# Patient Record
Sex: MALE | Race: WHITE | Employment: OTHER | URBAN - METROPOLITAN AREA
[De-identification: names, ages, dates, MRNs, and addresses within clinical notes are randomized per-mention and may not be internally consistent; named-entity substitution may affect disease eponyms.]

---

## 2019-12-12 ENCOUNTER — APPOINTMENT (OUTPATIENT)
Dept: GENERAL RADIOLOGY | Age: 84
End: 2019-12-12
Payer: MEDICARE

## 2019-12-12 ENCOUNTER — HOSPITAL ENCOUNTER (EMERGENCY)
Age: 84
Discharge: HOME | End: 2019-12-13
Attending: EMERGENCY MEDICINE
Payer: MEDICARE

## 2019-12-12 VITALS
BODY MASS INDEX: 26.08 KG/M2 | OXYGEN SATURATION: 98 % | WEIGHT: 186.29 LBS | HEIGHT: 71 IN | SYSTOLIC BLOOD PRESSURE: 156 MMHG | DIASTOLIC BLOOD PRESSURE: 61 MMHG | HEART RATE: 56 BPM | RESPIRATION RATE: 17 BRPM | TEMPERATURE: 97.1 F

## 2019-12-12 DIAGNOSIS — S81.812A LACERATION OF LEFT LOWER EXTREMITY, INITIAL ENCOUNTER: Primary | ICD-10-CM

## 2019-12-12 PROCEDURE — 90715 TDAP VACCINE 7 YRS/> IM: CPT | Performed by: EMERGENCY MEDICINE

## 2019-12-12 PROCEDURE — 99283 EMERGENCY DEPT VISIT LOW MDM: CPT

## 2019-12-12 PROCEDURE — 73630 X-RAY EXAM OF FOOT: CPT

## 2019-12-12 PROCEDURE — 4500000023 HC ED LEVEL 3 PROCEDURE

## 2019-12-12 PROCEDURE — 90471 IMMUNIZATION ADMIN: CPT | Performed by: EMERGENCY MEDICINE

## 2019-12-12 PROCEDURE — 6360000002 HC RX W HCPCS: Performed by: EMERGENCY MEDICINE

## 2019-12-12 PROCEDURE — 73590 X-RAY EXAM OF LOWER LEG: CPT

## 2019-12-12 RX ORDER — CEPHALEXIN 500 MG/1
1000 CAPSULE ORAL ONCE
Status: COMPLETED | OUTPATIENT
Start: 2019-12-13 | End: 2019-12-13

## 2019-12-12 RX ORDER — CEPHALEXIN 500 MG/1
500 CAPSULE ORAL 3 TIMES DAILY
Qty: 30 CAPSULE | Refills: 0 | Status: SHIPPED | OUTPATIENT
Start: 2019-12-12 | End: 2019-12-22

## 2019-12-12 RX ADMIN — TETANUS TOXOID, REDUCED DIPHTHERIA TOXOID AND ACELLULAR PERTUSSIS VACCINE, ADSORBED 0.5 ML: 5; 2.5; 8; 8; 2.5 SUSPENSION INTRAMUSCULAR at 23:09

## 2019-12-12 SDOH — HEALTH STABILITY: MENTAL HEALTH: HOW OFTEN DO YOU HAVE A DRINK CONTAINING ALCOHOL?: NEVER

## 2019-12-12 ASSESSMENT — PAIN SCALES - GENERAL
PAINLEVEL_OUTOF10: 0
PAINLEVEL_OUTOF10: 0

## 2019-12-13 PROCEDURE — 6370000000 HC RX 637 (ALT 250 FOR IP): Performed by: EMERGENCY MEDICINE

## 2019-12-13 RX ADMIN — CEPHALEXIN 1000 MG: 500 CAPSULE ORAL at 00:00

## 2019-12-13 ASSESSMENT — ENCOUNTER SYMPTOMS
NAUSEA: 0
BACK PAIN: 1
SORE THROAT: 0
DIARRHEA: 0
EYE DISCHARGE: 0
VOMITING: 0
COUGH: 0
EYE REDNESS: 0
WHEEZING: 0
ABDOMINAL PAIN: 0
EYE PAIN: 0
RHINORRHEA: 0
SHORTNESS OF BREATH: 0

## 2021-08-01 ENCOUNTER — APPOINTMENT (OUTPATIENT)
Dept: GENERAL RADIOLOGY | Age: 86
DRG: 560 | End: 2021-08-01
Payer: MEDICARE

## 2021-08-01 ENCOUNTER — ANESTHESIA (OUTPATIENT)
Dept: OPERATING ROOM | Age: 86
DRG: 560 | End: 2021-08-01
Payer: MEDICARE

## 2021-08-01 ENCOUNTER — HOSPITAL ENCOUNTER (INPATIENT)
Age: 86
LOS: 5 days | Discharge: NURSING, REHAB OR LONG-TERM CARE | DRG: 560 | End: 2021-08-06
Attending: EMERGENCY MEDICINE | Admitting: INTERNAL MEDICINE
Payer: MEDICARE

## 2021-08-01 ENCOUNTER — ANESTHESIA EVENT (OUTPATIENT)
Dept: OPERATING ROOM | Age: 86
DRG: 560 | End: 2021-08-01
Payer: MEDICARE

## 2021-08-01 VITALS
DIASTOLIC BLOOD PRESSURE: 55 MMHG | SYSTOLIC BLOOD PRESSURE: 104 MMHG | RESPIRATION RATE: 1 BRPM | OXYGEN SATURATION: 100 %

## 2021-08-01 DIAGNOSIS — Z96.649 DISLOCATION OF HIP JOINT PROSTHESIS, INITIAL ENCOUNTER (HCC): Primary | ICD-10-CM

## 2021-08-01 DIAGNOSIS — E87.1 HYPONATREMIA: ICD-10-CM

## 2021-08-01 DIAGNOSIS — T84.029A DISLOCATION OF HIP JOINT PROSTHESIS, INITIAL ENCOUNTER (HCC): Primary | ICD-10-CM

## 2021-08-01 PROBLEM — S73.004A CLOSED DISLOCATION OF RIGHT HIP (HCC): Status: ACTIVE | Noted: 2021-08-01

## 2021-08-01 LAB
ANION GAP SERPL CALCULATED.3IONS-SCNC: 13 MMOL/L (ref 3–16)
BASOPHILS ABSOLUTE: 0.2 K/UL (ref 0–0.2)
BASOPHILS RELATIVE PERCENT: 1 %
BUN BLDV-MCNC: 29 MG/DL (ref 7–20)
CALCIUM SERPL-MCNC: 7.8 MG/DL (ref 8.3–10.6)
CHLORIDE BLD-SCNC: 93 MMOL/L (ref 99–110)
CO2: 20 MMOL/L (ref 21–32)
CREAT SERPL-MCNC: 0.9 MG/DL (ref 0.8–1.3)
EKG ATRIAL RATE: 59 BPM
EKG DIAGNOSIS: NORMAL
EKG P AXIS: 96 DEGREES
EKG P-R INTERVAL: 264 MS
EKG Q-T INTERVAL: 472 MS
EKG QRS DURATION: 136 MS
EKG QTC CALCULATION (BAZETT): 467 MS
EKG R AXIS: 21 DEGREES
EKG T AXIS: 31 DEGREES
EKG VENTRICULAR RATE: 59 BPM
EOSINOPHILS ABSOLUTE: 0.1 K/UL (ref 0–0.6)
EOSINOPHILS RELATIVE PERCENT: 0.6 %
GFR AFRICAN AMERICAN: >60
GFR NON-AFRICAN AMERICAN: >60
GLUCOSE BLD-MCNC: 102 MG/DL (ref 70–99)
GLUCOSE BLD-MCNC: 111 MG/DL (ref 70–99)
GLUCOSE BLD-MCNC: 118 MG/DL (ref 70–99)
GLUCOSE BLD-MCNC: 137 MG/DL (ref 70–99)
GLUCOSE BLD-MCNC: 150 MG/DL (ref 70–99)
HCT VFR BLD CALC: 24.4 % (ref 40.5–52.5)
HEMOGLOBIN: 8.2 G/DL (ref 13.5–17.5)
LYMPHOCYTES ABSOLUTE: 1.2 K/UL (ref 1–5.1)
LYMPHOCYTES RELATIVE PERCENT: 7.5 %
MCH RBC QN AUTO: 30.8 PG (ref 26–34)
MCHC RBC AUTO-ENTMCNC: 33.4 G/DL (ref 31–36)
MCV RBC AUTO: 92.2 FL (ref 80–100)
MONOCYTES ABSOLUTE: 1.3 K/UL (ref 0–1.3)
MONOCYTES RELATIVE PERCENT: 8 %
NEUTROPHILS ABSOLUTE: 13.4 K/UL (ref 1.7–7.7)
NEUTROPHILS RELATIVE PERCENT: 82.9 %
PDW BLD-RTO: 16.3 % (ref 12.4–15.4)
PERFORMED ON: ABNORMAL
PLATELET # BLD: 372 K/UL (ref 135–450)
PMV BLD AUTO: 7.6 FL (ref 5–10.5)
POTASSIUM SERPL-SCNC: 4.1 MMOL/L (ref 3.5–5.1)
RBC # BLD: 2.65 M/UL (ref 4.2–5.9)
SODIUM BLD-SCNC: 126 MMOL/L (ref 136–145)
WBC # BLD: 16.2 K/UL (ref 4–11)

## 2021-08-01 PROCEDURE — 99221 1ST HOSP IP/OBS SF/LOW 40: CPT | Performed by: ORTHOPAEDIC SURGERY

## 2021-08-01 PROCEDURE — 85025 COMPLETE CBC W/AUTO DIFF WBC: CPT

## 2021-08-01 PROCEDURE — 72170 X-RAY EXAM OF PELVIS: CPT

## 2021-08-01 PROCEDURE — 6360000002 HC RX W HCPCS: Performed by: EMERGENCY MEDICINE

## 2021-08-01 PROCEDURE — 7100000001 HC PACU RECOVERY - ADDTL 15 MIN: Performed by: ORTHOPAEDIC SURGERY

## 2021-08-01 PROCEDURE — 3600000002 HC SURGERY LEVEL 2 BASE: Performed by: ORTHOPAEDIC SURGERY

## 2021-08-01 PROCEDURE — 6360000002 HC RX W HCPCS: Performed by: ANESTHESIOLOGY

## 2021-08-01 PROCEDURE — 3600000012 HC SURGERY LEVEL 2 ADDTL 15MIN: Performed by: ORTHOPAEDIC SURGERY

## 2021-08-01 PROCEDURE — 2500000003 HC RX 250 WO HCPCS: Performed by: EMERGENCY MEDICINE

## 2021-08-01 PROCEDURE — 99152 MOD SED SAME PHYS/QHP 5/>YRS: CPT

## 2021-08-01 PROCEDURE — 93010 ELECTROCARDIOGRAM REPORT: CPT | Performed by: INTERNAL MEDICINE

## 2021-08-01 PROCEDURE — 96374 THER/PROPH/DIAG INJ IV PUSH: CPT

## 2021-08-01 PROCEDURE — 2580000003 HC RX 258: Performed by: INTERNAL MEDICINE

## 2021-08-01 PROCEDURE — 6370000000 HC RX 637 (ALT 250 FOR IP): Performed by: INTERNAL MEDICINE

## 2021-08-01 PROCEDURE — 73501 X-RAY EXAM HIP UNI 1 VIEW: CPT

## 2021-08-01 PROCEDURE — 3700000000 HC ANESTHESIA ATTENDED CARE: Performed by: ORTHOPAEDIC SURGERY

## 2021-08-01 PROCEDURE — 27266 TREAT HIP DISLOCATION: CPT | Performed by: ORTHOPAEDIC SURGERY

## 2021-08-01 PROCEDURE — 80048 BASIC METABOLIC PNL TOTAL CA: CPT

## 2021-08-01 PROCEDURE — 7100000000 HC PACU RECOVERY - FIRST 15 MIN: Performed by: ORTHOPAEDIC SURGERY

## 2021-08-01 PROCEDURE — 99153 MOD SED SAME PHYS/QHP EA: CPT

## 2021-08-01 PROCEDURE — 2500000003 HC RX 250 WO HCPCS: Performed by: ANESTHESIOLOGY

## 2021-08-01 PROCEDURE — 6360000002 HC RX W HCPCS: Performed by: INTERNAL MEDICINE

## 2021-08-01 PROCEDURE — 1200000000 HC SEMI PRIVATE

## 2021-08-01 PROCEDURE — 36415 COLL VENOUS BLD VENIPUNCTURE: CPT

## 2021-08-01 PROCEDURE — 96375 TX/PRO/DX INJ NEW DRUG ADDON: CPT

## 2021-08-01 PROCEDURE — 0SS9XZZ REPOSITION RIGHT HIP JOINT, EXTERNAL APPROACH: ICD-10-PCS | Performed by: ORTHOPAEDIC SURGERY

## 2021-08-01 PROCEDURE — 27250 TREAT HIP DISLOCATION: CPT

## 2021-08-01 PROCEDURE — 3700000001 HC ADD 15 MINUTES (ANESTHESIA): Performed by: ORTHOPAEDIC SURGERY

## 2021-08-01 PROCEDURE — 87040 BLOOD CULTURE FOR BACTERIA: CPT

## 2021-08-01 PROCEDURE — 93005 ELECTROCARDIOGRAM TRACING: CPT | Performed by: EMERGENCY MEDICINE

## 2021-08-01 PROCEDURE — 6370000000 HC RX 637 (ALT 250 FOR IP): Performed by: EMERGENCY MEDICINE

## 2021-08-01 PROCEDURE — 3209999900 FLUORO FOR SURGICAL PROCEDURES

## 2021-08-01 PROCEDURE — 99285 EMERGENCY DEPT VISIT HI MDM: CPT

## 2021-08-01 PROCEDURE — 2580000003 HC RX 258: Performed by: ORTHOPAEDIC SURGERY

## 2021-08-01 RX ORDER — CITALOPRAM 20 MG/1
10 TABLET ORAL DAILY
Status: DISCONTINUED | OUTPATIENT
Start: 2021-08-01 | End: 2021-08-06 | Stop reason: HOSPADM

## 2021-08-01 RX ORDER — LIDOCAINE HYDROCHLORIDE 20 MG/ML
INJECTION, SOLUTION EPIDURAL; INFILTRATION; INTRACAUDAL; PERINEURAL PRN
Status: DISCONTINUED | OUTPATIENT
Start: 2021-08-01 | End: 2021-08-01 | Stop reason: SDUPTHER

## 2021-08-01 RX ORDER — FAMOTIDINE 20 MG/1
20 TABLET, FILM COATED ORAL DAILY
Status: DISCONTINUED | OUTPATIENT
Start: 2021-08-01 | End: 2021-08-06 | Stop reason: HOSPADM

## 2021-08-01 RX ORDER — OXYCODONE HYDROCHLORIDE 5 MG/1
5 CAPSULE ORAL EVERY 4 HOURS PRN
COMMUNITY

## 2021-08-01 RX ORDER — ATORVASTATIN CALCIUM 40 MG/1
80 TABLET, FILM COATED ORAL NIGHTLY
Status: DISCONTINUED | OUTPATIENT
Start: 2021-08-01 | End: 2021-08-06 | Stop reason: HOSPADM

## 2021-08-01 RX ORDER — SODIUM CHLORIDE 9 MG/ML
25 INJECTION, SOLUTION INTRAVENOUS PRN
Status: DISCONTINUED | OUTPATIENT
Start: 2021-08-01 | End: 2021-08-01 | Stop reason: HOSPADM

## 2021-08-01 RX ORDER — CITALOPRAM 10 MG/1
10 TABLET ORAL DAILY
COMMUNITY

## 2021-08-01 RX ORDER — ATORVASTATIN CALCIUM 80 MG/1
80 TABLET, FILM COATED ORAL DAILY
COMMUNITY

## 2021-08-01 RX ORDER — POLYETHYLENE GLYCOL 3350 17 G/17G
17 POWDER, FOR SOLUTION ORAL DAILY PRN
COMMUNITY

## 2021-08-01 RX ORDER — HYDROMORPHONE HYDROCHLORIDE 1 MG/ML
0.25 INJECTION, SOLUTION INTRAMUSCULAR; INTRAVENOUS; SUBCUTANEOUS
Status: DISCONTINUED | OUTPATIENT
Start: 2021-08-01 | End: 2021-08-06 | Stop reason: HOSPADM

## 2021-08-01 RX ORDER — FENTANYL CITRATE 50 UG/ML
25 INJECTION, SOLUTION INTRAMUSCULAR; INTRAVENOUS ONCE
Status: COMPLETED | OUTPATIENT
Start: 2021-08-01 | End: 2021-08-01

## 2021-08-01 RX ORDER — POTASSIUM CHLORIDE 20 MEQ/1
40 TABLET, EXTENDED RELEASE ORAL PRN
Status: DISCONTINUED | OUTPATIENT
Start: 2021-08-01 | End: 2021-08-06 | Stop reason: HOSPADM

## 2021-08-01 RX ORDER — PROPOFOL 10 MG/ML
1 INJECTION, EMULSION INTRAVENOUS ONCE
Status: COMPLETED | OUTPATIENT
Start: 2021-08-01 | End: 2021-08-01

## 2021-08-01 RX ORDER — ACETAMINOPHEN 325 MG/1
650 TABLET ORAL EVERY 6 HOURS PRN
Status: DISCONTINUED | OUTPATIENT
Start: 2021-08-01 | End: 2021-08-06 | Stop reason: HOSPADM

## 2021-08-01 RX ORDER — BISACODYL 10 MG
10 SUPPOSITORY, RECTAL RECTAL DAILY PRN
Status: DISCONTINUED | OUTPATIENT
Start: 2021-08-01 | End: 2021-08-06 | Stop reason: HOSPADM

## 2021-08-01 RX ORDER — MAGNESIUM HYDROXIDE/ALUMINUM HYDROXICE/SIMETHICONE 120; 1200; 1200 MG/30ML; MG/30ML; MG/30ML
30 SUSPENSION ORAL EVERY 4 HOURS PRN
COMMUNITY

## 2021-08-01 RX ORDER — KETAMINE HYDROCHLORIDE 100 MG/ML
1 INJECTION, SOLUTION INTRAMUSCULAR; INTRAVENOUS ONCE
Status: COMPLETED | OUTPATIENT
Start: 2021-08-01 | End: 2021-08-01

## 2021-08-01 RX ORDER — ONDANSETRON 2 MG/ML
4 INJECTION INTRAMUSCULAR; INTRAVENOUS EVERY 6 HOURS PRN
Status: DISCONTINUED | OUTPATIENT
Start: 2021-08-01 | End: 2021-08-06 | Stop reason: HOSPADM

## 2021-08-01 RX ORDER — EPHEDRINE SULFATE/0.9% NACL/PF 50 MG/5 ML
SYRINGE (ML) INTRAVENOUS PRN
Status: DISCONTINUED | OUTPATIENT
Start: 2021-08-01 | End: 2021-08-01 | Stop reason: SDUPTHER

## 2021-08-01 RX ORDER — HYDROMORPHONE HYDROCHLORIDE 1 MG/ML
0.5 INJECTION, SOLUTION INTRAMUSCULAR; INTRAVENOUS; SUBCUTANEOUS
Status: DISCONTINUED | OUTPATIENT
Start: 2021-08-01 | End: 2021-08-06 | Stop reason: HOSPADM

## 2021-08-01 RX ORDER — TAMSULOSIN HYDROCHLORIDE 0.4 MG/1
0.4 CAPSULE ORAL DAILY
Status: DISCONTINUED | OUTPATIENT
Start: 2021-08-01 | End: 2021-08-06 | Stop reason: HOSPADM

## 2021-08-01 RX ORDER — PROPOFOL 10 MG/ML
INJECTION, EMULSION INTRAVENOUS PRN
Status: DISCONTINUED | OUTPATIENT
Start: 2021-08-01 | End: 2021-08-01 | Stop reason: SDUPTHER

## 2021-08-01 RX ORDER — SENNA PLUS 8.6 MG/1
TABLET ORAL EVERY 12 HOURS PRN
COMMUNITY

## 2021-08-01 RX ORDER — INSULIN LISPRO 100 [IU]/ML
0-3 INJECTION, SOLUTION INTRAVENOUS; SUBCUTANEOUS NIGHTLY
Status: DISCONTINUED | OUTPATIENT
Start: 2021-08-01 | End: 2021-08-06 | Stop reason: HOSPADM

## 2021-08-01 RX ORDER — ONDANSETRON 4 MG/1
4 TABLET, ORALLY DISINTEGRATING ORAL EVERY 8 HOURS PRN
Status: DISCONTINUED | OUTPATIENT
Start: 2021-08-01 | End: 2021-08-06 | Stop reason: HOSPADM

## 2021-08-01 RX ORDER — POLYETHYLENE GLYCOL 3350 17 G/17G
17 POWDER, FOR SOLUTION ORAL DAILY PRN
Status: DISCONTINUED | OUTPATIENT
Start: 2021-08-01 | End: 2021-08-06 | Stop reason: HOSPADM

## 2021-08-01 RX ORDER — NICOTINE POLACRILEX 4 MG
15 LOZENGE BUCCAL PRN
Status: DISCONTINUED | OUTPATIENT
Start: 2021-08-01 | End: 2021-08-06 | Stop reason: HOSPADM

## 2021-08-01 RX ORDER — TAMSULOSIN HYDROCHLORIDE 0.4 MG/1
0.4 CAPSULE ORAL DAILY
COMMUNITY

## 2021-08-01 RX ORDER — MEMANTINE HYDROCHLORIDE 5 MG/1
7 TABLET ORAL DAILY
Status: ON HOLD | COMMUNITY
End: 2021-08-01 | Stop reason: ALTCHOICE

## 2021-08-01 RX ORDER — SODIUM CHLORIDE 0.9 % (FLUSH) 0.9 %
5-40 SYRINGE (ML) INJECTION EVERY 12 HOURS SCHEDULED
Status: DISCONTINUED | OUTPATIENT
Start: 2021-08-01 | End: 2021-08-01 | Stop reason: HOSPADM

## 2021-08-01 RX ORDER — ACETAMINOPHEN 325 MG/1
650 TABLET ORAL EVERY 4 HOURS PRN
COMMUNITY

## 2021-08-01 RX ORDER — ACETAMINOPHEN 500 MG
500 TABLET ORAL 3 TIMES DAILY
COMMUNITY

## 2021-08-01 RX ORDER — DEXTROSE MONOHYDRATE 25 G/50ML
12.5 INJECTION, SOLUTION INTRAVENOUS PRN
Status: DISCONTINUED | OUTPATIENT
Start: 2021-08-01 | End: 2021-08-06 | Stop reason: HOSPADM

## 2021-08-01 RX ORDER — ASPIRIN 81 MG/1
81 TABLET ORAL DAILY
COMMUNITY

## 2021-08-01 RX ORDER — DEXTROSE MONOHYDRATE 50 MG/ML
100 INJECTION, SOLUTION INTRAVENOUS PRN
Status: DISCONTINUED | OUTPATIENT
Start: 2021-08-01 | End: 2021-08-06 | Stop reason: HOSPADM

## 2021-08-01 RX ORDER — BISACODYL 10 MG
10 SUPPOSITORY, RECTAL RECTAL DAILY PRN
COMMUNITY

## 2021-08-01 RX ORDER — POTASSIUM CHLORIDE 7.45 MG/ML
10 INJECTION INTRAVENOUS PRN
Status: DISCONTINUED | OUTPATIENT
Start: 2021-08-01 | End: 2021-08-06 | Stop reason: HOSPADM

## 2021-08-01 RX ORDER — MAGNESIUM SULFATE IN WATER 40 MG/ML
2000 INJECTION, SOLUTION INTRAVENOUS PRN
Status: DISCONTINUED | OUTPATIENT
Start: 2021-08-01 | End: 2021-08-06 | Stop reason: HOSPADM

## 2021-08-01 RX ORDER — OXYCODONE HYDROCHLORIDE AND ACETAMINOPHEN 5; 325 MG/1; MG/1
1 TABLET ORAL ONCE
Status: DISCONTINUED | OUTPATIENT
Start: 2021-08-01 | End: 2021-08-06 | Stop reason: HOSPADM

## 2021-08-01 RX ORDER — SODIUM CHLORIDE 0.9 % (FLUSH) 0.9 %
5-40 SYRINGE (ML) INJECTION PRN
Status: DISCONTINUED | OUTPATIENT
Start: 2021-08-01 | End: 2021-08-01 | Stop reason: HOSPADM

## 2021-08-01 RX ORDER — LANOLIN ALCOHOL/MO/W.PET/CERES
6 CREAM (GRAM) TOPICAL NIGHTLY PRN
COMMUNITY

## 2021-08-01 RX ORDER — HYDROCODONE BITARTRATE AND ACETAMINOPHEN 5; 325 MG/1; MG/1
1 TABLET ORAL ONCE
Status: COMPLETED | OUTPATIENT
Start: 2021-08-01 | End: 2021-08-01

## 2021-08-01 RX ORDER — SODIUM CHLORIDE 0.9 % (FLUSH) 0.9 %
5-40 SYRINGE (ML) INJECTION PRN
Status: DISCONTINUED | OUTPATIENT
Start: 2021-08-01 | End: 2021-08-06 | Stop reason: HOSPADM

## 2021-08-01 RX ORDER — INSULIN LISPRO 100 [IU]/ML
0-6 INJECTION, SOLUTION INTRAVENOUS; SUBCUTANEOUS
Status: DISCONTINUED | OUTPATIENT
Start: 2021-08-01 | End: 2021-08-06 | Stop reason: HOSPADM

## 2021-08-01 RX ORDER — ASPIRIN 81 MG/1
81 TABLET ORAL DAILY
Status: DISCONTINUED | OUTPATIENT
Start: 2021-08-01 | End: 2021-08-06 | Stop reason: HOSPADM

## 2021-08-01 RX ORDER — MEMANTINE HYDROCHLORIDE 5 MG/1
7 TABLET ORAL DAILY
Status: DISCONTINUED | OUTPATIENT
Start: 2021-08-01 | End: 2021-08-01 | Stop reason: ALTCHOICE

## 2021-08-01 RX ORDER — SODIUM CHLORIDE 9 MG/ML
25 INJECTION, SOLUTION INTRAVENOUS PRN
Status: DISCONTINUED | OUTPATIENT
Start: 2021-08-01 | End: 2021-08-06 | Stop reason: HOSPADM

## 2021-08-01 RX ORDER — SODIUM CHLORIDE 0.9 % (FLUSH) 0.9 %
5-40 SYRINGE (ML) INJECTION EVERY 12 HOURS SCHEDULED
Status: DISCONTINUED | OUTPATIENT
Start: 2021-08-01 | End: 2021-08-06 | Stop reason: HOSPADM

## 2021-08-01 RX ORDER — OXYCODONE HYDROCHLORIDE AND ACETAMINOPHEN 5; 325 MG/1; MG/1
1 TABLET ORAL EVERY 4 HOURS PRN
Status: DISCONTINUED | OUTPATIENT
Start: 2021-08-01 | End: 2021-08-06 | Stop reason: HOSPADM

## 2021-08-01 RX ORDER — LANOLIN ALCOHOL/MO/W.PET/CERES
6 CREAM (GRAM) TOPICAL NIGHTLY PRN
Status: DISCONTINUED | OUTPATIENT
Start: 2021-08-01 | End: 2021-08-06 | Stop reason: HOSPADM

## 2021-08-01 RX ORDER — ACETAMINOPHEN 650 MG/1
650 SUPPOSITORY RECTAL EVERY 6 HOURS PRN
Status: DISCONTINUED | OUTPATIENT
Start: 2021-08-01 | End: 2021-08-06 | Stop reason: HOSPADM

## 2021-08-01 RX ADMIN — Medication 10 ML: at 23:55

## 2021-08-01 RX ADMIN — FAMOTIDINE 20 MG: 20 TABLET ORAL at 11:02

## 2021-08-01 RX ADMIN — SODIUM CHLORIDE: 9 INJECTION, SOLUTION INTRAVENOUS at 08:57

## 2021-08-01 RX ADMIN — FENTANYL CITRATE 25 MCG: 50 INJECTION, SOLUTION INTRAMUSCULAR; INTRAVENOUS at 04:53

## 2021-08-01 RX ADMIN — CITALOPRAM HYDROBROMIDE 10 MG: 20 TABLET ORAL at 11:02

## 2021-08-01 RX ADMIN — KETAMINE HYDROCHLORIDE 70 MG: 100 INJECTION INTRAMUSCULAR; INTRAVENOUS at 06:09

## 2021-08-01 RX ADMIN — Medication 25 MG: at 09:08

## 2021-08-01 RX ADMIN — INSULIN LISPRO 1 UNITS: 100 INJECTION, SOLUTION INTRAVENOUS; SUBCUTANEOUS at 23:52

## 2021-08-01 RX ADMIN — PROPOFOL 100 MG: 10 INJECTION, EMULSION INTRAVENOUS at 09:01

## 2021-08-01 RX ADMIN — Medication 15 MG: at 09:12

## 2021-08-01 RX ADMIN — MELATONIN TAB 3 MG 6 MG: 3 TAB at 22:00

## 2021-08-01 RX ADMIN — ATORVASTATIN CALCIUM 80 MG: 40 TABLET, FILM COATED ORAL at 22:00

## 2021-08-01 RX ADMIN — LIDOCAINE HYDROCHLORIDE 100 MG: 20 INJECTION, SOLUTION EPIDURAL; INFILTRATION; INTRACAUDAL; PERINEURAL at 09:01

## 2021-08-01 RX ADMIN — ASPIRIN 81 MG: 81 TABLET, COATED ORAL at 11:02

## 2021-08-01 RX ADMIN — TAMSULOSIN HYDROCHLORIDE 0.4 MG: 0.4 CAPSULE ORAL at 11:02

## 2021-08-01 RX ADMIN — ENOXAPARIN SODIUM 40 MG: 40 INJECTION SUBCUTANEOUS at 11:02

## 2021-08-01 RX ADMIN — Medication 10 MG: at 09:05

## 2021-08-01 RX ADMIN — PROPOFOL 72 MG: 10 INJECTION, EMULSION INTRAVENOUS at 06:08

## 2021-08-01 RX ADMIN — HYDROCODONE BITARTRATE AND ACETAMINOPHEN 1 TABLET: 5; 325 TABLET ORAL at 07:53

## 2021-08-01 ASSESSMENT — PAIN SCALES - GENERAL
PAINLEVEL_OUTOF10: 10

## 2021-08-01 ASSESSMENT — PULMONARY FUNCTION TESTS
PIF_VALUE: 21
PIF_VALUE: 0
PIF_VALUE: 25
PIF_VALUE: 22
PIF_VALUE: 21
PIF_VALUE: 2
PIF_VALUE: 23
PIF_VALUE: 0
PIF_VALUE: 0
PIF_VALUE: 26
PIF_VALUE: 23
PIF_VALUE: 1
PIF_VALUE: 1
PIF_VALUE: 0
PIF_VALUE: 18
PIF_VALUE: 1
PIF_VALUE: 0
PIF_VALUE: 3
PIF_VALUE: 19
PIF_VALUE: 28
PIF_VALUE: 26

## 2021-08-01 ASSESSMENT — LIFESTYLE VARIABLES: SMOKING_STATUS: 0

## 2021-08-01 NOTE — PROGRESS NOTES
Dr. Arely Gu to admit patient. Patient awakens to voice, VSS. Will transfer to 4T. Phase 1 recovery completed.

## 2021-08-01 NOTE — ACP (ADVANCE CARE PLANNING)
ADVANCED CARE PLANNING    Name:Baldev Layton       :  3/18/1929              MRN:  [id number]      Purpose of Encounter: Advanced care planning in light of dementia. Parties in attendance: :Elena Faith MD, Family members: Jocelynn Bangura, daughter, [place]. Decisional Capacity:No    Diagnosis: Active Problems:    Hip dislocation, right, initial encounter ([address]    Closed dislocation of right hip ([address]  Resolved Problems:    * No resolved hospital problems. *    Patients Medical Story: 80-year-old male with history of advanced dementia nursing home resident admitted for right hip dislocation. Goals of Care Determinations: Patient wishes to focus on no resuscitative measures, no intubation. Would like to talk to hospice. Plan: Will notify CHARLIE HALE of change in care plan. Will look at further interventions as needed. Code Status: At this time patient wishes to be DNR-CCA  Time Spent with Patient: 15 minutes      Electronically signed by Ramana Valverde MD on 2021 at 2:26 PM  Thank you April Smith for the opportunity to be involved in this patient's care.

## 2021-08-01 NOTE — ED PROVIDER NOTES
Attaending addendum. Please see conscious sedation by Dr. Jamey Kaba. Patient had received ketamine and propofol infusion with adequate sedation with no hypoxia no complication. Hip prosthesis reduction. Consent was obtained, patient noted his right hip, timeout was performed preprocedure. Multiple aggressive techniques were performed with deep sedation all without successful reduction. The patient was neurovascular intact pre and post procedure recurrent attempts were made in various techniques all without success. Further times were abandoned due to the concern for the possibility of periprosthetic fracture given the aggressiveness of reduction attempts.   Orthopedics was consulted and the patient will be transferred to the operating room for definitive reduction      Impression: Prosthetic right hip posterior dislocation persistent     Cathy Hardwick MD  69/77/39 [id number]

## 2021-08-01 NOTE — PROGRESS NOTES
Patient to PACU from OR. Drowsy. VSS. Left leg/foot swollen, bilateral doppler pedal and post tibial pulses. Patient has skin tear on left arm, dressed per OR staff.

## 2021-08-01 NOTE — OP NOTE
Patient: Nick Benton  YOB: 1929  MRN: [id number]    Date of Procedure: 8/1/2021      Pre-Op Diagnosis:  Dislocation of right total hip arthroplasty     Post-Op Diagnosis: Same       Procedure Performed: Closed reduction of right hip dislocation     Surgeon: Delbert Barbosa MD      Anesthesia: General     Estimated Blood Loss:  None     Complications: None    Indications: This is a 80 y.o. male who underwent anterior right total hip arthroplasty about 2 months ago for a femoral neck fracture. He was doing well until recently when he sustained a right hip dislocation overnight. Closed reduction was attempted in emergency department but the hip was not able to be reduced. I recommended close reduction in the operating room. The operative procedure, alternatives, and risks were discussed in detail with the patient's family. The risks include but are not limited to: Fracture, vessel injury, nerve injury, DVT, pulmonary embolism, implant loosening, need for revision surgery, persistent dislocation. Informed consent for surgery was signed by the patient's healthcare power of . Details: The patient was seen in the preoperative holding area where the site of surgery was marked and informed consent was confirmed. The patient was brought back to the operating room by OR personnel. General anesthesia was administered. The patient was positioned supine on the [place] table. A final and formal timeout was then performed which confirmed the correct patient, correct position, and correct site of surgery. IV antibiotics were not indicated. He had what appeared to be a posterior hip dislocation as his leg was internally rotated and the knee flexed. This was converted to an anterior dislocation with traction and external rotation. Traction was then increased and hip was internally rotated. There was an audible clunk as the hip was reduced.   The leg was removed from the leg pereira and taken through a range of motion was found to be stable. Fluoroscopic imaging was used during the reduction and final films were saved. This image showed that the hip was concentrically reduced. The patient tolerated procedure well. He will be admitted postoperatively. He was placed in a hip abduction pillow. A hip abduction brace will be ordered.     Natanael Patel MD  8/1/2021

## 2021-08-01 NOTE — ED NOTES
Assumed care of pt  Pt in bed, hx of dementia, confused per normal, per daughter at the bedside  Pt c/o severe right hip pain, deformity noted  Per daughter pt lives at [facility]. Pt is a DNR per daughter  Per daughter pt was going to the bathroom at the [facility] this am, pt is constipated per daughter, pt had a fall during the trip to the bathroom. Pt has hx of initial hip fracture in June of this year.       Nithin Cruz RN  08/01/21 7352

## 2021-08-01 NOTE — ED NOTES
Bedside handoff to OR Rn  Pt transferred to OR with belongings and daughter.        Marvin Mao RN  08/01/21 6443

## 2021-08-01 NOTE — H&P
Hospital Medicine History & Physical      PCP: Joann Munoz    Date of Admission: 8/1/2021    Date of Service: Pt seen/examined on 8/1/2021 and Admitted to Inpatient with expected LOS greater than two midnights due to medical therapy. Chief Complaint: Hip dislocation      History Of Present Illness: 80 y.o. male with past medical history of recent hip fracture, status post hip arthroplasty, history of CAD, advanced dementia, diabetes mellitus, BPH presents from nursing facility in view of hip dislocation. Patient status post OR for closed reduction of right hip with orthopedic surgery today. Medicine consulted for admission. Patient does not provide good history in view of dementia. Patient's daughter [address] provides history as above. Patient currently states he is comfortable and denies pain. Past Medical History:          Diagnosis Date    CAD (coronary artery disease)     Cervical disc disease     Cognitive communication deficit     Dementia ([address]     [facility]     Hip fracture ([facility]     Hyperlipidemia     Hypertension     Lack of coordination     Muscle weakness     Other abnormalities of gait and mobility     Spinal stenosis     Unsteadiness on feet        Past Surgical History:      History reviewed. No pertinent surgical history. Medications Prior to Admission:      Prior to Admission medications    Medication Sig Start Date End Date Taking?  Authorizing Provider   aluminum & magnesium hydroxide-simethicone (MAALOX) 200-200-20 MG/5ML SUSP suspension Take 30 mLs by mouth every 4 hours as needed for Indigestion   Yes Historical Provider, MD   sennosides (SENOKOT) 8.8 MG/5ML syrup Take by mouth nightly   Yes Historical Provider, MD   acetaminophen (TYLENOL) 500 MG tablet Take 500 mg by mouth 3 times daily    Historical Provider, MD   acetaminophen (TYLENOL) 325 MG tablet Take 650 mg by mouth every 4 hours as needed for Pain or Fever    Historical Provider, MD   aspirin 81 MG EC tablet Take 81 mg by mouth daily    Historical Provider, MD   atorvastatin (LIPITOR) 80 MG tablet Take 80 mg by mouth daily    Historical Provider, MD   bisacodyl (DULCOLAX) 5 MG EC tablet Take 5 mg by mouth daily as needed for Constipation    Historical Provider, MD   citalopram (CELEXA) 10 MG tablet Take 10 mg by mouth daily    Historical Provider, MD   bisacodyl (DULCOLAX) 10 MG suppository Place 10 mg rectally daily as needed for Constipation    Historical Provider, MD   melatonin 3 MG TABS tablet Take 6 mg by mouth nightly as needed    Historical Provider, MD   polyethylene glycol (GLYCOLAX) 17 g packet Take 17 g by mouth daily as needed for Constipation    Historical Provider, MD   oxyCODONE 5 MG capsule Take 5 mg by mouth every 4 hours as needed for Pain. Historical Provider, MD   senna (SENOKOT) 8.6 MG tablet Take by mouth every 12 hours as needed for Constipation    Historical Provider, MD   tamsulosin (FLOMAX) 0.4 MG capsule Take 0.4 mg by mouth daily    Historical Provider, MD   Multiple Vitamins-Minerals (THERABETIC MULTI-VITAMIN PO) Take 1 tablet by mouth daily    Historical Provider, MD       Allergies:  Patient has no known allergies. Social History:      The patient currently lives nursing home    TOBACCO:   reports that he has never smoked. He has never used smokeless tobacco.  ETOH:   reports previous alcohol use. E-Cigarettes/Vaping Use     Questions Responses    E-Cigarette/Vaping Use Never User    Start Date     Passive Exposure     Quit Date     Counseling Given     Comments             Family History:       Reviewed in detail and negative for DM, CAD, Cancer, CVA. Positive as follows:    History reviewed. No pertinent family history. REVIEW OF SYSTEMS:   Pertinent positives as noted in the HPI. All other systems reviewed and negative.     PHYSICAL EXAM PERFORMED:    BP (!) 117/54   Pulse 70   Temp 97 °F (36.1 °C) (Temporal)   Resp 16   Ht 5' 11\" (1.803 m)   Wt 159 lb (72.1 kg)   SpO2 99%   BMI 22.18 kg/m²     General appearance:  No apparent distress, appears stated age and cooperative. HEENT:  Normal cephalic, atraumatic without obvious deformity. Pupils equal, round, and reactive to light. Extra ocular muscles intact. Conjunctivae/corneas clear. Neck: Supple, with full range of motion. No jugular venous distention. Trachea midline. Respiratory:  Normal respiratory effort. Clear to auscultation, bilaterally without Rales/Wheezes/Rhonchi. Cardiovascular:  Regular rate and rhythm with normal S1/S2 without murmurs, rubs or gallops. Abdomen: Soft, non-tender, non-distended with normal bowel sounds. Musculoskeletal:  No clubbing, cyanosis or edema bilaterally. Full range of motion without deformity. Skin: Skin color, texture, turgor normal.  No rashes or lesions. Neurologic:  Neurovascularly intact without any focal sensory/motor deficits. Cranial nerves: II-XII intact, grossly non-focal.  Psychiatric:  Alert and oriented, thought content appropriate, normal insight  Peripheral Pulses: +2 palpable, equal bilaterally       Labs:     Recent Labs     08/01/21  0747   WBC 16.2*   HGB 8.2*   HCT 24.4*        Recent Labs     08/01/21  0747   *   K 4.1   CL 93*   CO2 20*   BUN 29*   CREATININE 0.9   CALCIUM 7.8*     No results for input(s): AST, ALT, BILIDIR, BILITOT, ALKPHOS in the last 72 hours. No results for input(s): INR in the last 72 hours. No results for input(s): Earlis Ecorse in the last 72 hours. Urinalysis:    No results found for: Ashley Long, BACTERIA, [address] [facility], [facility], [address], [place] [place] [facility], [place] [address]    Radiology:     CXR: I have reviewed the CXR with the following interpretation: Not available  EKG:  I have reviewed the EKG with the following interpretation: Mild sinus bradycardia with aberrant conduction, right bundle pearl block.     FLUORO FOR SURGICAL PROCEDURES   Final Result      XR HIP LEFT (1 VIEW)   Final Result      XR PELVIS (1-2 VIEWS) Final Result   Persistent superior dislocation of the right hip prosthesis. XR HIP RIGHT (1 VIEW)   Final Result   Dislocated right hip prosthesis. ASSESSMENT:    Active Hospital Problems    Diagnosis Date Noted    Closed dislocation of right hip ([facility] [S73.004A] 08/01/2021    Hip dislocation, right, initial encounter ([facility] [S73.004A]          PLAN:    Close dislocation of right hip  Status post reduction  Pain management per orthopedic surgery    Leukocytosis  Likely reactive  Follow-up blood cultures  Follow-up UA    Hyponatremia  Hypovolemic  IVF  Follow-up urine electrolytes    CAD  Asymptomatic  Continue aspirin and statin    BPH  Continue Flomax  Patel catheter    Diabetes mellitus  Not currently on any antihyperglycemic's  Carb controlled diet  POCT AC at bedtime  Sliding scale insulin    Advanced dementia  CODE STATUS changed to DNR CCA  Hospice consulted after discussion with daughter    DVT Prophylaxis: Lovenox  Diet: ADULT DIET;  Regular; 5 carb choices (75 gm/meal)  Code Status: DNR-CCA    Electronically signed by Jessica Ureña MD on 8/1/2021 at 2:21 PM

## 2021-08-01 NOTE — PROGRESS NOTES
Assisted with conscious sedation. Pt was placed on 3 L NC with CO2 monitor. Pt did not have strong CO2 output, most times registering a CO2 of 10. Monitor showed desating with bad pleth but bagged pt to be safe. After attempt to put hip in place, pt came up out of sedation with no complications noted.

## 2021-08-01 NOTE — ED PROVIDER NOTES
[address] [facility] PROVIDER NOTE    Patient Identification  Pt Name: Andres Epperson  MRN: [id number]  Donnygfestee 3/18/1929  Date of evaluation: 8/1/2021  Provider: Amy Kearney MD  PCP: Destiny HALE    Chief Complaint  Hip Pain (fall 3 weeks ago, R femur fx, sudden onset of R hip pain 1 hour ago, 10/10.)      HPI  (History provided by patient and EMS)  This is a 80 y.o. male who was brought in by EMS transportation after developing sudden onset of right hip pain approximately 1 hour prior to arrival.  Patient was about healthy [facility] and has dementia. He has confusion at baseline. He is unable to fully provide history as to what happened to cause him to injure his right hip. Per history, patient tripped and fell while walking to the bathroom, landing on his right side. He denies having any pain elsewhere in the body. Pain is severe, constant, and worse with any attempted movement of the right leg. ROS  10 systems reviewed, pertinent positives/negatives per HPI otherwise noted to be negative. I have reviewed the following nursing documentation:  Allergies: Patient has no known allergies. Past medical history:   Past Medical History:   Diagnosis Date    CAD (coronary artery disease)     Cervical disc disease     Cognitive communication deficit     [facility]     Diabetes mellitus [facility]     [facility] fracture [facility]     Hyperlipidemia     Hypertension     Lack of coordination     Muscle weakness     Other abnormalities of gait and mobility     Spinal stenosis     Unsteadiness on feet      Past surgical history: History reviewed. No pertinent surgical history.     Home medications:   Previous Medications    ACETAMINOPHEN (TYLENOL) 325 MG TABLET    Take 650 mg by mouth every 4 hours as needed for Pain or Fever    ACETAMINOPHEN (TYLENOL) 500 MG TABLET    Take 500 mg by mouth 3 times daily    ALUMINUM & MAGNESIUM HYDROXIDE-SIMETHICONE (Cruzito Cruz) 379-767-57 MG/5ML SUSP SUSPENSION    Take 30 mLs by mouth every 4 hours as needed for Indigestion    ASPIRIN 81 MG EC TABLET    Take 81 mg by mouth daily    ATORVASTATIN (LIPITOR) 80 MG TABLET    Take 80 mg by mouth daily    BISACODYL (DULCOLAX) 10 MG SUPPOSITORY    Place 10 mg rectally daily as needed for Constipation    BISACODYL (DULCOLAX) 5 MG EC TABLET    Take 5 mg by mouth daily as needed for Constipation    CITALOPRAM (CELEXA) 10 MG TABLET    Take 10 mg by mouth daily    MELATONIN 3 MG TABS TABLET    Take 6 mg by mouth nightly as needed    MEMANTINE (NAMENDA) 5 MG TABLET    Take 7 mg by mouth daily    MULTIPLE VITAMINS-MINERALS (THERABETIC MULTI-VITAMIN PO)    Take 1 tablet by mouth daily    OXYCODONE 5 MG CAPSULE    Take 5 mg by mouth every 4 hours as needed for Pain. POLYETHYLENE GLYCOL (GLYCOLAX) 17 G PACKET    Take 17 g by mouth daily as needed for Constipation    SENNA (SENOKOT) 8.6 MG TABLET    Take by mouth every 12 hours as needed for Constipation    SENNOSIDES (SENOKOT) 8.8 MG/5ML SYRUP    Take by mouth nightly    TAMSULOSIN (FLOMAX) 0.4 MG CAPSULE    Take 0.4 mg by mouth daily       Social history:  reports that he has never smoked. He has never used smokeless tobacco. He reports previous alcohol use. He reports that he does not use drugs. Family history:  History reviewed. No pertinent family history. Exam  ED Triage Vitals   BP Temp Temp src Pulse Resp SpO2 Height Weight   08/01/21 0326 08/01/21 0326 -- 08/01/21 0326 08/01/21 0326 08/01/21 0326 08/01/21 0400 08/01/21 0400   (!) 164/78 97 °F (36.1 °C)  71 16 97 % 5' 11\" (1.803 m) 159 lb (72.1 kg)     Nursing note and vitals reviewed. Constitutional: Patient is in acute pain distress. However, he is otherwise awake, alert, and oriented. He is not toxic. HENT:      Head: Normocephalic and atraumatic. Ears: External ears normal.      Nose: Nose normal.     Mouth: Membrane mucosa moist and pink. Eyes: Anicteric sclera. No discharge.    Neck: Supple. Trachea midline. Cardiovascular: RRR; no murmurs, rubs, or gallops. +2 dorsalis pedis pulse in the right foot. Brisk capillary refill throughout the toes of the right foot. Pulmonary/Chest: Effort normal. No respiratory distress. CTAB. No stridor. No wheezes. No rales. Musculoskeletal: Moves all extremities. Deformity of the right hip consistent with dislocation or fracture. Neurological: Alert and oriented. Face symmetric. Normal sensation throughout the right foot. Normal flexion and extension of the right foot. Skin: Warm and dry. No rash. Psychiatric: Normal mood and affect. Behavior is normal.    Procedures  PROCEDURE:  PROCEDURAL SEDATION    Pre-sedation Assessment    Intended Procedure: Reduction of right prosthetic hip dislocation. Pre-Procedure Assessment/Plan:  Airway:Normal  ASA 3 - Patient with moderate systemic disease with functional limitations    Level of Sedation Plan:Deep sedation    Post Procedure plan: Return to same level of care    I assessed the patient and find that the patient is in satisfactory condition to proceed with the planned procedure and sedation plan. The benefits, risks, and alternatives of procedural sedation were discussed with Oscar Harry or their surrogate. We discussed the potential side effects or adverse reactions that could occur with Ketamine and Propofol. An opportunity to ask questions was provided, and consent was given for the procedure. Oxygen was administered, and the appropriate pre-procedural policies were followed. Hospital protocol for cardiac, oxygen saturation, and blood pressure monitoring occurred. For details of the dosage administered, see the procedural sedation documentation. Oscar Harry tolerated the medication and procedure without complications. Oscar Harry woke up without difficulty, and was sent home with someone to evaluate them during the post-sedation period.     Total amount of inservice time: 20

## 2021-08-01 NOTE — CONSULTS
[facility] Orthopedic Surgery  Consult Note    Patient: Siena Ray  Admit Date: 8/1/2021  Requesting Physician: No admitting provider for patient encounter. Chief complaint: Right prosthetic hip dislocation    HPI: Siena Ray is a 80 y.o. male who presented to [facility] ED this morning from a nursing facility after sustaining a right prosthetic hip dislocation. He sustained a right femoral neck fracture in early June 2021 and underwent anterior left total hip arthroplasty with Dr Yamilet Echols at [facility] at that time. He has been doing well but has had issues with his bowels lately. His daughter believes that he dislocated his hip when getting above follow-up. He stays in a nursing facility and has dementia. He denies any other issues at this time. Closed reduction was attempted in the ED and was unsuccessful. Medical History:  Past Medical History:   Diagnosis Date    CAD (coronary artery disease)     Cervical disc disease     Cognitive communication deficit     Dementia ([address]     [facility]     Hip fracture ([facility]     Hyperlipidemia     Hypertension     Lack of coordination     Muscle weakness     Other abnormalities of gait and mobility     Spinal stenosis     Unsteadiness on feet      History reviewed. No pertinent surgical history. Social History:    reports that he has never smoked. He has never used smokeless tobacco.    Family History:  History reviewed. No pertinent family history. Medications:  ALL MEDICATIONS HAVE BEEN REVIEWED:  Scheduled:   oxyCODONE-acetaminophen  1 tablet Oral Once    sodium chloride flush  5-40 mL Intravenous 2 times per day     Continuous:   sodium chloride       PRN:sodium chloride flush, sodium chloride    Allergies: No Known Allergies    Review of Systems:  Limited by patient status but reports right hip pain.     Objective:  Vitals:    08/01/21 0844   BP: 124/85   Pulse: 60   Resp: 18   Temp: 96.5 °F (35.8 °C) SpO2: 97%      Physical Examination:  GENERAL: No apparent distress, lying in bed  SKIN:  Warm and dry  HEAD: Normocephalic, atraumatic  RESPIRATORY: Resp easy and unlabored  NEURO: Awake and alert. No speech defect  PSYCHIATRIC: Appropriate affect; not agitated  MUSCULOSKELETAL:    RLE -right leg held with knee flexed and internal rotation. He demonstrates active ankle plantarflexion and dorsiflexion. His erythema and some wounds distally in his lower leg and ankle and has bandages in place. Brisk cap refill in the foot. Reports sensation intact light touch distally. Labs reviewed:  Recent Labs     08/01/21  0747   WBC 16.2*   HGB 8.2*   HCT 24.4*        Recent Labs     08/01/21  0747   *   K 4.1   CL 93*   CO2 20*   BUN 29*   CREATININE 0.9   GLUCOSE 102*   CALCIUM 7.8*     Imaging:  XR PELVIS (1-2 VIEWS)   Final Result   Persistent superior dislocation of the right hip prosthesis. XR HIP RIGHT (1 VIEW)   Final Result   Dislocated right hip prosthesis. IMPRESSION:  Right total hip arthroplasty dislocation    RECOMMENDATIONS:  I discussed the diagnosis and treatment options with his daughter. I recommended close reduction in the OR. We went over the risks and complications of surgery including: continued pain, instability, fracture, dislocation, neurovascular injury, post op cognitive disorder, DVT, pulmonary embolism and need for further surgical procedures. Informed consent for surgery was signed. He will be admitted to hospitalist postoperatively. He will be placed in a hip abduction brace. He will follow up with Dr. Fernanda Vargas.     Abdelrahman Cabrera MD  8/1/2021

## 2021-08-01 NOTE — ED NOTES
Verbal consent for R hip reduction by patient with MD Hill and Ai at bedside     Lennox Maid, [facility]  08/01/21 1089

## 2021-08-01 NOTE — ED NOTES
[facility]  meds pushed by MD evans for sedation     Abena Maynard, JOSE  08/01/21 6958

## 2021-08-01 NOTE — ANESTHESIA PRE PROCEDURE
Department of Anesthesiology  Preprocedure Note       Name:  Jb Dyson   Age:  80 y.o.  :  3/18/1929                                          MRN:  [id number]         Date:  2021      Surgeon: Jennifer Moody):  Harry Glover MD    Procedure: Procedure(s):  HIP CLOSED REDUCTION    Medications prior to admission:   Prior to Admission medications    Medication Sig Start Date End Date Taking? Authorizing Provider   acetaminophen (TYLENOL) 500 MG tablet Take 500 mg by mouth 3 times daily   Yes Historical Provider, MD   acetaminophen (TYLENOL) 325 MG tablet Take 650 mg by mouth every 4 hours as needed for Pain or Fever   Yes Historical Provider, MD   aspirin 81 MG EC tablet Take 81 mg by mouth daily   Yes Historical Provider, MD   atorvastatin (LIPITOR) 80 MG tablet Take 80 mg by mouth daily   Yes Historical Provider, MD   bisacodyl (DULCOLAX) 5 MG EC tablet Take 5 mg by mouth daily as needed for Constipation   Yes Historical Provider, MD   citalopram (CELEXA) 10 MG tablet Take 10 mg by mouth daily   Yes Historical Provider, MD   bisacodyl (DULCOLAX) 10 MG suppository Place 10 mg rectally daily as needed for Constipation   Yes Historical Provider, MD   melatonin 3 MG TABS tablet Take 6 mg by mouth nightly as needed   Yes Historical Provider, MD   aluminum & magnesium hydroxide-simethicone (MAALOX) 200-200-20 MG/5ML SUSP suspension Take 30 mLs by mouth every 4 hours as needed for Indigestion   Yes Historical Provider, MD   memantine (NAMENDA) 5 MG tablet Take 7 mg by mouth daily   Yes Historical Provider, MD   polyethylene glycol (GLYCOLAX) 17 g packet Take 17 g by mouth daily as needed for Constipation   Yes Historical Provider, MD   oxyCODONE 5 MG capsule Take 5 mg by mouth every 4 hours as needed for Pain.    Yes Historical Provider, MD   sennosides (SENOKOT) 8.8 MG/5ML syrup Take by mouth nightly   Yes Historical Provider, MD   senna (SENOKOT) 8.6 MG tablet Take by mouth every 12 hours as needed for Constipation   Yes Historical Provider, MD   tamsulosin (FLOMAX) 0.4 MG capsule Take 0.4 mg by mouth daily   Yes Historical Provider, MD   Multiple Vitamins-Minerals (THERABETIC MULTI-VITAMIN PO) Take 1 tablet by mouth daily   Yes Historical Provider, MD       Current medications:    Current Facility-Administered Medications   Medication Dose Route Frequency Provider Last Rate Last Admin    oxyCODONE-acetaminophen (PERCOCET) 5-325 MG per tablet 1 tablet  1 tablet Oral Once Mahi Zapata MD        HYDROcodone-acetaminophen (NORCO) 5-325 MG per tablet 1 tablet  1 tablet Oral Once Mahi Zapata MD         Current Outpatient Medications   Medication Sig Dispense Refill    acetaminophen (TYLENOL) 500 MG tablet Take 500 mg by mouth 3 times daily      acetaminophen (TYLENOL) 325 MG tablet Take 650 mg by mouth every 4 hours as needed for Pain or Fever      aspirin 81 MG EC tablet Take 81 mg by mouth daily      atorvastatin (LIPITOR) 80 MG tablet Take 80 mg by mouth daily      bisacodyl (DULCOLAX) 5 MG EC tablet Take 5 mg by mouth daily as needed for Constipation      citalopram (CELEXA) 10 MG tablet Take 10 mg by mouth daily      bisacodyl (DULCOLAX) 10 MG suppository Place 10 mg rectally daily as needed for Constipation      melatonin 3 MG TABS tablet Take 6 mg by mouth nightly as needed      aluminum & magnesium hydroxide-simethicone (MAALOX) 200-200-20 MG/5ML SUSP suspension Take 30 mLs by mouth every 4 hours as needed for Indigestion      memantine (NAMENDA) 5 MG tablet Take 7 mg by mouth daily      polyethylene glycol (GLYCOLAX) 17 g packet Take 17 g by mouth daily as needed for Constipation      oxyCODONE 5 MG capsule Take 5 mg by mouth every 4 hours as needed for Pain.       sennosides (SENOKOT) 8.8 MG/5ML syrup Take by mouth nightly      senna (SENOKOT) 8.6 MG tablet Take by mouth every 12 hours as needed for Constipation      tamsulosin (FLOMAX) 0.4 MG capsule Take 0.4 mg by mouth daily      Multiple Vitamins-Minerals (THERABETIC MULTI-VITAMIN PO) Take 1 tablet by mouth daily         Allergies:  No Known Allergies    Problem List:  There is no problem list on file for this patient. Past Medical History:        Diagnosis Date    CAD (coronary artery disease)     Cervical disc disease     Cognitive communication deficit     [facility]     Diabetes mellitus [facility]     [facility]     Hyperlipidemia     Hypertension     Lack of coordination     Muscle weakness     Other abnormalities of gait and mobility     Spinal stenosis     Unsteadiness on feet        Past Surgical History:  History reviewed. No pertinent surgical history. Social History:    Social History     Tobacco Use    Smoking status: Never Smoker    Smokeless tobacco: Never Used   Substance Use Topics    Alcohol use: Not Currently                                Counseling given: Not Answered      Vital Signs (Current):   Vitals:    08/01/21 0610 08/01/21 0615 08/01/21 0630 08/01/21 0745   BP: (!) 155/64 (!) 170/126 103/67 (!) 139/57   Pulse: 62 97 63 60   Resp: 14 26 16 17   Temp:       SpO2: 100% 99% 100% 100%   Weight:       Height:                                                  BP Readings from Last 3 Encounters:   08/01/21 (!) 139/57   12/12/19 (!) 156/61       NPO Status:                                                                                 BMI:   Wt Readings from Last 3 Encounters:   08/01/21 159 lb (72.1 kg)   12/12/19 186 lb 4.6 oz (84.5 kg)     Body mass index is 22.18 kg/m². CBC: No results found for: WBC, RBC, HGB, HCT, MCV, RDW, PLT    CMP: No results found for: NA, K, CL, CO2, BUN, CREATININE, GFRAA, AGRATIO, LABGLOM, GLUCOSE, PROT, CALCIUM, BILITOT, ALKPHOS, AST, ALT    POC Tests: No results for input(s): POCGLU, POCNA, POCK, POCCL, POCBUN, POCHEMO, POCHCT in the last 72 hours.     Coags: No results found for: PROTIME, INR, APTT    HCG (If Applicable): No results found for: PREGTESTUR, PREGSERUM, HCG, HCGQUANT     ABGs: No results found for: PHART, PO2ART, HPG1YHY, IQF6ARC, BEART, D1ESEEIL     Type & Screen (If Applicable):  No results found for: LABABO, LABRH    Drug/Infectious Status (If Applicable):  No results found for: HIV, HEPCAB    COVID-19 Screening (If Applicable): No results found for: COVID19        Anesthesia Evaluation  Patient summary reviewed and Nursing notes reviewed no history of anesthetic complications:   Airway: Mallampati: III  TM distance: >3 FB   Neck ROM: full  Mouth opening: > = 3 FB Dental:    (+) upper dentures      Pulmonary:Negative Pulmonary ROS and normal exam  breath sounds clear to auscultation      (-) COPD, asthma, sleep apnea and not a current smoker                           Cardiovascular:  Exercise tolerance: poor (<4 METS),   (+) hypertension:, CAD (intermittent cards f/u, no recent issues/symptoms/ntg use):, CABG/stent (BMS to cx and OM, remote, followed by repeat intervention with cutting balloon for in-stent restenosis 2003):, hyperlipidemia    (-) dysrhythmias,  angina and  CHF (nml Ef on prior studies)    ECG reviewed  Rhythm: regular  Rate: normal                    Neuro/Psych:   (+) neuromuscular disease (cervical ddd/ss):, psychiatric history (dementia, mod-sev per poa):depression/anxiety    (-) seizures, TIA and CVA           GI/Hepatic/Renal:   (+) renal disease: CRI,      (-) GERD and liver disease       Endo/Other:    (+) Diabetes (a1c 5)Type II DM, , : arthritis: OA., .    (-) hypothyroidism, hyperthyroidism               Abdominal:             Vascular: Other Findings:           Anesthesia Plan      MAC and general     ASA 4 - emergent       Induction: intravenous. MIPS: Postoperative opioids intended and Prophylactic antiemetics administered. Anesthetic plan and risks discussed with patient and healthcare power of .                     Suzanne Du MD   8/1/2021

## 2021-08-01 NOTE — PROGRESS NOTES
Admission assessment complete. Neuro checks WDL. VSS. The patient came from the OR after having the right dislocated hip reduced. The patient at baseline knows his name. Patient will follow commands. Medications taken whole with water. The patient has an existing bowling in place from [address]. Skin Issues: 1. The patient's coccyx has some redness, non-blanchable areas, and open areas: Mepilex applied. 2. The left foot has a healing abrasion, stated by the family, from his shoe, Mepilex applied. 3. Right heel has an open wound, Mepliex applied. 4. The left forearm has a open skin tear, cause unknown, Mepliex applied. 5. The patient has scattered bumps and busies in different stages of healing. Wound care consult will be placed. The patient is resting in bed with call light in reach. Camera in place. Abductor pillow continues to be in place.

## 2021-08-02 LAB
ANION GAP SERPL CALCULATED.3IONS-SCNC: 7 MMOL/L (ref 3–16)
BASOPHILS ABSOLUTE: 0.1 K/UL (ref 0–0.2)
BASOPHILS RELATIVE PERCENT: 0.6 %
BUN BLDV-MCNC: 23 MG/DL (ref 7–20)
CALCIUM SERPL-MCNC: 8 MG/DL (ref 8.3–10.6)
CHLORIDE BLD-SCNC: 98 MMOL/L (ref 99–110)
CO2: 23 MMOL/L (ref 21–32)
CREAT SERPL-MCNC: 0.8 MG/DL (ref 0.8–1.3)
EOSINOPHILS ABSOLUTE: 0.3 K/UL (ref 0–0.6)
EOSINOPHILS RELATIVE PERCENT: 2.9 %
GFR AFRICAN AMERICAN: >60
GFR NON-AFRICAN AMERICAN: >60
GLUCOSE BLD-MCNC: 113 MG/DL (ref 70–99)
GLUCOSE BLD-MCNC: 118 MG/DL (ref 70–99)
GLUCOSE BLD-MCNC: 126 MG/DL (ref 70–99)
GLUCOSE BLD-MCNC: 155 MG/DL (ref 70–99)
GLUCOSE BLD-MCNC: 94 MG/DL (ref 70–99)
HCT VFR BLD CALC: 21.6 % (ref 40.5–52.5)
HEMOGLOBIN: 7.4 G/DL (ref 13.5–17.5)
LYMPHOCYTES ABSOLUTE: 0.9 K/UL (ref 1–5.1)
LYMPHOCYTES RELATIVE PERCENT: 8.5 %
MAGNESIUM: 1.9 MG/DL (ref 1.8–2.4)
MCH RBC QN AUTO: 31.8 PG (ref 26–34)
MCHC RBC AUTO-ENTMCNC: 34.5 G/DL (ref 31–36)
MCV RBC AUTO: 92.4 FL (ref 80–100)
MONOCYTES ABSOLUTE: 1 K/UL (ref 0–1.3)
MONOCYTES RELATIVE PERCENT: 10 %
NEUTROPHILS ABSOLUTE: 7.9 K/UL (ref 1.7–7.7)
NEUTROPHILS RELATIVE PERCENT: 78 %
PDW BLD-RTO: 16.2 % (ref 12.4–15.4)
PERFORMED ON: ABNORMAL
PHOSPHORUS: 3.6 MG/DL (ref 2.5–4.9)
PLATELET # BLD: 305 K/UL (ref 135–450)
PLATELET SLIDE REVIEW: ADEQUATE
PMV BLD AUTO: 7.4 FL (ref 5–10.5)
POTASSIUM SERPL-SCNC: 4.4 MMOL/L (ref 3.5–5.1)
RBC # BLD: 2.34 M/UL (ref 4.2–5.9)
SLIDE REVIEW: ABNORMAL
SODIUM BLD-SCNC: 128 MMOL/L (ref 136–145)
WBC # BLD: 10.1 K/UL (ref 4–11)

## 2021-08-02 PROCEDURE — 6370000000 HC RX 637 (ALT 250 FOR IP): Performed by: INTERNAL MEDICINE

## 2021-08-02 PROCEDURE — 36415 COLL VENOUS BLD VENIPUNCTURE: CPT

## 2021-08-02 PROCEDURE — APPNB45 APP NON BILLABLE 31-45 MINUTES: Performed by: NURSE PRACTITIONER

## 2021-08-02 PROCEDURE — 97166 OT EVAL MOD COMPLEX 45 MIN: CPT

## 2021-08-02 PROCEDURE — 84100 ASSAY OF PHOSPHORUS: CPT

## 2021-08-02 PROCEDURE — 97530 THERAPEUTIC ACTIVITIES: CPT

## 2021-08-02 PROCEDURE — 6360000002 HC RX W HCPCS: Performed by: INTERNAL MEDICINE

## 2021-08-02 PROCEDURE — 99024 POSTOP FOLLOW-UP VISIT: CPT | Performed by: NURSE PRACTITIONER

## 2021-08-02 PROCEDURE — 1200000000 HC SEMI PRIVATE

## 2021-08-02 PROCEDURE — 2580000003 HC RX 258: Performed by: INTERNAL MEDICINE

## 2021-08-02 PROCEDURE — 85025 COMPLETE CBC W/AUTO DIFF WBC: CPT

## 2021-08-02 PROCEDURE — 97535 SELF CARE MNGMENT TRAINING: CPT

## 2021-08-02 PROCEDURE — 83735 ASSAY OF MAGNESIUM: CPT

## 2021-08-02 PROCEDURE — 97162 PT EVAL MOD COMPLEX 30 MIN: CPT

## 2021-08-02 PROCEDURE — 80048 BASIC METABOLIC PNL TOTAL CA: CPT

## 2021-08-02 RX ADMIN — FAMOTIDINE 20 MG: 20 TABLET ORAL at 09:14

## 2021-08-02 RX ADMIN — Medication 10 ML: at 21:47

## 2021-08-02 RX ADMIN — OXYCODONE HYDROCHLORIDE AND ACETAMINOPHEN 1 TABLET: 5; 325 TABLET ORAL at 15:00

## 2021-08-02 RX ADMIN — OXYCODONE HYDROCHLORIDE AND ACETAMINOPHEN 1 TABLET: 5; 325 TABLET ORAL at 21:44

## 2021-08-02 RX ADMIN — Medication 10 ML: at 09:15

## 2021-08-02 RX ADMIN — ENOXAPARIN SODIUM 40 MG: 40 INJECTION SUBCUTANEOUS at 09:14

## 2021-08-02 RX ADMIN — ASPIRIN 81 MG: 81 TABLET, COATED ORAL at 09:14

## 2021-08-02 RX ADMIN — CITALOPRAM HYDROBROMIDE 10 MG: 20 TABLET ORAL at 09:14

## 2021-08-02 RX ADMIN — INSULIN LISPRO 1 UNITS: 100 INJECTION, SOLUTION INTRAVENOUS; SUBCUTANEOUS at 17:22

## 2021-08-02 RX ADMIN — BISACODYL 10 MG: 10 SUPPOSITORY RECTAL at 17:22

## 2021-08-02 RX ADMIN — TAMSULOSIN HYDROCHLORIDE 0.4 MG: 0.4 CAPSULE ORAL at 09:14

## 2021-08-02 RX ADMIN — MELATONIN TAB 3 MG 6 MG: 3 TAB at 21:44

## 2021-08-02 RX ADMIN — ATORVASTATIN CALCIUM 80 MG: 40 TABLET, FILM COATED ORAL at 21:44

## 2021-08-02 ASSESSMENT — PAIN SCALES - GENERAL
PAINLEVEL_OUTOF10: 5
PAINLEVEL_OUTOF10: 6
PAINLEVEL_OUTOF10: 5
PAINLEVEL_OUTOF10: 0

## 2021-08-02 NOTE — PROGRESS NOTES
[facility] Orthopedic Surgery   Progress Note    CHIEF COMPLAINT/DIAGNOSIS: S/p RIGHT hip closed reduction     SUBJECTIVE: The patient is sitting up in the bed; describes no hip pain. Pleasantly confused. Oriented to person only. Denies pain. He removed hip abduction pillow. Underwent index RIGHT JUAN RAMON for femoral neck fracture on 6/6/21 with Dr. Dameon Hutchins. OBJECTIVE  Physical    VITALS:  /63   Pulse 72   Temp 97.7 °F (36.5 °C) (Axillary)   Resp 18   Ht 5' 11\" (1.803 m)   Wt 159 lb (72.1 kg)   SpO2 99%   BMI 22.18 kg/m²     GENERAL: Alert and oriented x1, in no acute distress. MUSCULOSKELETAL: Able to dorsi and plantarflex the ankle on right side without issue. INCISION:  Anterior healed. ROM: Deferred  Sensory:  Intact to light touch in peroneal and tibial distributions. Vascular:   2+ DP pulses with brisk cap refill;  calf soft and nontender. Data    ALL MEDICATIONS HAVE BEEN REVIEWED    CBC:   Recent Labs     08/01/21  0747 08/02/21  0533   WBC 16.2* 10.1   HGB 8.2* 7.4*   HCT 24.4* 21.6*    305     BMP:   Recent Labs     08/01/21  0747 08/02/21  0533   * 128*   K 4.1 4.4   CL 93* 98*   CO2 20* 23   PHOS  --  3.6   BUN 29* 23*   CREATININE 0.9 0.8     INR: No results for input(s): INR in the last 72 hours. ASSESSMENT:  S/p LEFT hip closed reduction (8/1/21)  Dementia  DM II  Hyponatremia  Anemia    PLAN:   - WB status:  WBAT left leg; posterior hip precautions; ABD pillow in place until hip abduction brace (to arrive around lunch). - DVT prophylaxis: OK for lovenox as inpt per primary team.   - PT/OT  - Pain Control: Tylenol prn.   - Disposition: OK to d/c back to SNF from our end after hip abduction brace placed today. Follow-up in 2-4 weeks with Dr. Dameon Hutchins [facility]).      SAADIA Greer - CNP  8/2/2021  8:06 AM

## 2021-08-02 NOTE — PROGRESS NOTES
Physical Therapy    Facility/Department: [address] ORTHO/NEURO NURSING  Initial Assessment    NAME: Siena Ray  : 3/18/1929  MRN: [id number]    Date of Service: 2021    Discharge Recommendations:  Siena Ray scored a 6/24 on the AM-PAC short mobility form. Current research shows that an AM-PAC score of 17 or less is typically not associated with a discharge to the patient's home setting. Based on the patient's AM-PAC score and their current functional mobility deficits, it is recommended that the patient have 3-5 sessions per week of Physical Therapy at d/c to increase the patient's independence. Please see assessment section for further patient specific details. If patient discharges prior to next session this note will serve as a discharge summary. Please see below for the latest assessment towards goals. 3-5 sessions per week   PT Equipment Recommendations  Equipment Needed: No    Assessment   Body structures, Functions, Activity limitations: Decreased functional mobility ; Decreased ADL status; Decreased balance;Decreased safe awareness;Decreased cognition;Decreased posture  Assessment: Pt presenting with the above deficits following R hip dislocation/relocation and new need for abductor brace. Brace limiting mobility with increased pain. Currently 2 person assistance for mobility. Cognition limiting ability to comprehend need for brace and current hip precautions. Continued skilled PT to promote return towards PLOF. Prognosis: Fair  Decision Making: Medium Complexity  PT Education: PT Role;Transfer Training;Precautions; Functional Mobility Training  Patient Education: need reinforcement  Barriers to Learning: cognition, hearing  REQUIRES PT FOLLOW UP: Yes  Activity Tolerance  Activity Tolerance: Patient limited by pain  Activity Tolerance: Pt constantly complaining about abductor brace. Brace with limited hip flexion range affecting mobility.        Patient Diagnosis(es): The encounter diagnosis was Dislocation of hip joint prosthesis, initial encounter ([facility] has a past medical history of CAD (coronary artery disease), Cervical disc disease, Cognitive communication deficit, Dementia ([facility] Diabetes mellitus ([facility] Hip fracture ([facility] Hyperlipidemia, Hypertension, Lack of coordination, Muscle weakness, Other abnormalities of gait and mobility, Spinal stenosis, and Unsteadiness on feet.   has a past surgical history that includes Hip Closed Reduction (Right, 8/1/2021). Restrictions  Restrictions/Precautions  Restrictions/Precautions: Fall Risk, ROM Restrictions (High fall risk, regular diet)  Required Braces or Orthoses?: Yes  Required Braces or Orthoses  Right Lower Extremity Brace:  (abductor brace)  Position Activity Restriction  Hip Precautions: No hip flexion > 90 degrees, No hip internal rotation, No ADduction, No ABduction  Other position/activity restrictions: Siena Ray is a 80 y.o. male who presented to [facility] ED this morning from a nursing facility after sustaining a right prosthetic hip dislocation. He sustained a right femoral neck fracture in early June 2021 and underwent anterior left total hip arthroplasty with Dr Yamilet Echols at [facility] at that time. He has been doing well but has had issues with his bowels lately. His daughter believes that he dislocated his hip when getting above follow-up. He stays in a nursing facility and has dementia. He denies any other issues at this time. Vision/Hearing  Vision: Within Functional Limits  Hearing: Exceptions to [facility]  Hearing Exceptions: Hard of hearing/hearing concerns;Bilateral hearing aid     Subjective  General  Chart Reviewed: Yes  Family / Caregiver Present: Yes (dtg)  Diagnosis: closed dislocation of right hip s/p relocation  General Comment  Comments: supine in bed at arrival  Subjective  Subjective: Agreeable to evaluation, several complaints regarding abductor brace.   Reporting some discomfort in his right heel as well (did not rate)  Pain Screening  Patient Currently in Pain: Denies          Orientation  Orientation  Overall Orientation Status: Impaired  Orientation Level: Oriented to person  Social/Functional History  Social/Functional History  Type of Home: Facility (SNF vs LTC (unclear from family report))  ADL Assistance: Needs assistance (facility staff assisting with both bathing and dressing)  Ambulation Assistance: Needs assistance (with RW)  Transfer Assistance: Needs assistance (with RW)  Additional Comments: Poor historian due to dementia. Believe pt was receiving therapy services at the facility following his hip sx in June. Using RW for mobility with staff present to assist as needed. Able to feed himself. Cognition        Objective     Observation/Palpation  Posture: Fair    AROM RLE (degrees)  RLE General AROM: not tested due to restrictions for hip and due to brace placement  AROM LLE (degrees)  LLE General AROM: functional as observed with mobility  Strength RLE  Comment: functional for WB  Strength LLE  Comment: functional for WB        Bed mobility  Supine to Sit: 2 Person assistance (max A of 2)  Sit to Supine: 2 Person assistance (max A of 2)  Scooting: Maximal assistance  Comment: Max cues regarding hip precautions especially no flexion> 90 deg  Transfers  Sit to Stand: 2 Person Assistance (mod A of 2 from elevated bed, low recliner, and commode)  Stand to sit: 2 Person Assistance  Comment: Max cues regarding hip precautions during transfers. Once in recliner, pt requesting need to use bathroom. Raised toilet transfer x 2 reps. Partial stand within stedy with mod A of 1.   Ambulation  Ambulation?: Yes  WB Status: WBAT  Ambulation 1  Surface: level tile  Device: Rolling Walker  Assistance: 2 Person assistance (min A of 2 to mod A of 2)  Quality of Gait: small step length, flexed posture, heavy use of UEs on RW, step-to pattern  Gait Deviations: Slow Elena;Decreased step length;Decreased step height  Distance: 2 ft bed to chair, 8 ft chair to commode  Comments: Use of STEDY to transfer out of bathroom due to fatigue/pain. Pt impacted and nsg assisting with BM while standing at commode. Stairs/Curb  Stairs?: No     Balance  Posture: Fair  Sitting - Static: Fair  Sitting - Dynamic: Fair  Standing - Static: Poor  Standing - Dynamic: Poor        Plan   Plan  Times per week: 7x/wk  Times per day: Daily  Current Treatment Recommendations: Strengthening, Balance Training, Functional Mobility Training, Transfer Training, Gait Training  Safety Devices  Type of devices:  All fall risk precautions in place, Call light within reach, Bed alarm in place, Left in bed, Nurse notified    G-Code       OutComes Score                                                  AM-PAC Score  AM-PAC Inpatient Mobility Raw Score : 6 (08/02/21 1557)  AM-PAC Inpatient T-Scale Score : 23.55 (08/02/21 1557)  Mobility Inpatient CMS 0-100% Score: 100 (08/02/21 1557)  Mobility Inpatient CMS G-Code Modifier : CN (08/02/21 1557)          Goals  Short term goals  Time Frame for Short term goals: to be met by discharge  Short term goal 1: pt able to perform bed mobility with mod A  Short term goal 2: pt able to perform STS transfers with mod A of 1  Short term goal 3: pt able to ambulate 20 ft with RW and mod A of 1  Patient Goals   Patient goals : to walk       Therapy Time   Individual Concurrent Group Co-treatment   Time In 1346         Time Out 1443         Minutes 57         Timed Code Treatment Minutes: [address], [id number]

## 2021-08-02 NOTE — CARE COORDINATION
Discharge Planning Assessment  Rn/SW discharge planner met w/patient/family member to discuss reason for admission, current living situation, and potential needs at the time of discharge. Demographics/Insurance verified Yes    Current type of dwelling:LTC    Patient from ECF/SW confirmed with:confirmed w/pt's daughter and facility    Living arrangements: facility    Level of function/support: per facility    Transportation at time of d/c (Discussed w/pt/family that on the day of discharge home, tentative time of discharge will be between 10am and noon): stretcher transport to return to facility    Discussed and provided facilities of choice if transition to a skilled nursing facility is required a the time of discharge-discussed w/facilities. Tentative discharge plan: Daughter stated plan is for pt to return to facility for therapy-no hospice at this time-will discuss w/facility when needed.      Electronically signed by CLEO Man Mt  [phone]

## 2021-08-02 NOTE — DISCHARGE INSTR - COC
Assisted  Transfer  Assisted  Bathing  Assisted  Dressing  Assisted  Toileting  Assisted  Feeding  Assisted  Med Admin  Assisted  Med Delivery   whole    Wound Care Documentation and Therapy:        Elimination:  Continence:   · Bowel: No  · Bladder: No  Urinary Catheter: pre existing catheter changed 8/4   Colostomy/Ileostomy/Ileal Conduit: No       Date of Last BM: 8/6    Intake/Output Summary (Last 24 hours) at 8/2/2021 1124  Last data filed at 8/2/2021 0706  Gross per 24 hour   Intake --   Output 1275 ml   Net -1275 ml     I/O last 3 completed shifts: In: 300 [I.V.:300]  Out: 975 [Urine:975]    Safety Concerns: At Risk for Falls and ***    Impairments/Disabilities:      Hearing and dioriented thought process    Nutrition Therapy:  Current Nutrition Therapy: general      Routes of Feeding: Oral  Liquids: No Restrictions  Daily Fluid Restriction: no  Last Modified Barium Swallow with Video (Video Swallowing Test): not done    Treatments at the Time of Hospital Discharge:   Respiratory Treatments: no  Oxygen Therapy:  is not on home oxygen therapy. Ventilator:    - No ventilator support    Rehab Therapies: Physical Therapy and Occupational Therapy  Weight Bearing Status/Restrictions: No weight bearing restirctions  Other Medical Equipment (for information only, NOT a DME order):  walker and braces if refuses brace may use abductor pillow when in bed. Other Treatments: BMP in one week fax results to Dr Gifty Petersen to right heel wound daily keep off of surfaces  Hip abduction brace as needed/wanted by patient/family. USE hip abduction pillow in between legs at all times when not ambulating x 6 weeks. Follow-up with Dr. Blane Bains in 2 weeks. Call the office to scheduled. POSTERIOR HIP PRECAUTIONS:   Dont cross your legs. A pillow or triangular-shaped wedge may be used to block the legs from crossing. Don't roll your leg inward.  Dont bend the hip past ninety degrees.     To avoid bending past ninety degrees when sitting in a chair, lean back slightly.  Dont bend over past 90 degrees at the waist, which may occur if you bend over at the waist to tie your shoes or  items off the floor.  Instead, use a reacher to put on your shoes and socks or to  items from the floor. Patient's personal belongings (please select all that are sent with patient):  {CHP DME Belongings:[id number]}    RN SIGNATURE:  {Esignature:[id number]}    CASE MANAGEMENT/SOCIAL WORK SECTION    Inpatient Status Date: 08/01/21    Readmission Risk Assessment Score:  Readmission Risk              Risk of Unplanned Readmission:  19           Discharging to Facility/ Agency   · Name: [address] [place] [address]  · Address:  · Phone:[phone]  · Fax:[phone]    / signature: Electronically signed by CLEO Hansen on 8/2/2021 at 11:26 AM      PHYSICIAN SECTION    Prognosis: Guarded    Condition at Discharge: Stable    Rehab Potential (if transferring to Rehab): Guarded    Recommended Labs or Other Treatments After Discharge:     Repeat BMP in 1 week and fax the test result to nephrologist Dr. Bre Lopez office  Continue wound care per wound care nurse recs     Physician Certification: I certify the above information and transfer of Yesi Hart  is necessary for the continuing treatment of the diagnosis listed and that he requires [facility] for greater 30 days.      Update Admission H&P: No change in H&P    PHYSICIAN SIGNATURE:  Electronically signed by Niraj Umanzor MD on 8/2/21 at 4:51 PM EDT

## 2021-08-02 NOTE — DISCHARGE SUMMARY
Hospital Medicine Discharge Summary    Patient ID: Andres Zeenat      Patient's PCP: Joann Munoz    Admit Date: 8/1/2021     Discharge Date:   8/3/2021    Admitting Physician: Zoya Mao MD     Discharge Physician: Taco Emerson MD     Discharge Diagnoses: Active Hospital Problems    Diagnosis     Closed dislocation of right hip ([facility] [address] [G01.748I]     Hip dislocation, right, initial encounter ([facility] [address] [A41.847D]        The patient was seen and examined on day of discharge and this discharge summary is in conjunction with any daily progress note from day of discharge. Hospital Course:     80 y.o. male with past medical history of recent hip fracture, status post hip arthroplasty, history of CAD, advanced dementia, diabetes mellitus, BPH presents from nursing facility in view of hip dislocation. Patient status post OR for closed reduction of right hip with orthopedic surgery today. Medicine consulted for admission. Patient does not provide good history in view of dementia. Patient's daughter Keven Hathaway provides history as above. Patient currently states he is comfortable and denies pain. Close dislocation of right hip  Status post reduction  Pain management per orthopedic surgery  PT OT     Leukocytosis  Reactive  Cultures NTD     Hyponatremia  Follow-up sodium level morning 8/3/2021 prior to discharge  Patient provided with prescription from [facility] to be performed 8/5/2021  Free water restriction 1800 cc daily     CAD  Asymptomatic  Continue aspirin and statin     BPH  Continue Flomax     Diabetes mellitus  Not currently on any antihyperglycemic's  Carb controlled diet  POCT AC at bedtime  Sliding scale insulin          Physical Exam Performed:     BP (!) 125/50   Pulse 72   Temp 97.4 °F (36.3 °C) (Oral)   Resp 16   Ht 5' 11\" (1.803 m)   Wt 159 lb (72.1 kg)   SpO2 93%   BMI 22.18 kg/m²       General appearance:  No apparent distress, appears stated age and cooperative.   HEENT:  Normal cephalic, atraumatic without obvious deformity. Pupils equal, round, and reactive to light. Extra ocular muscles intact. Conjunctivae/corneas clear. Neck: Supple, with full range of motion. No jugular venous distention. Trachea midline. Respiratory:  Normal respiratory effort. Clear to auscultation, bilaterally without Rales/Wheezes/Rhonchi. Cardiovascular:  Regular rate and rhythm with normal S1/S2 without murmurs, rubs or gallops. Abdomen: Soft, non-tender, non-distended with normal bowel sounds. Musculoskeletal:  No clubbing, cyanosis or edema bilaterally. Full range of motion without deformity. Skin: Skin color, texture, turgor normal.  No rashes or lesions. Neurologic:  Neurovascularly intact without any focal sensory/motor deficits. Cranial nerves: II-XII intact, grossly non-focal.  Psychiatric:  Alert and oriented, thought content appropriate, normal insight  Capillary Refill: Brisk,< 3 seconds   Peripheral Pulses: +2 palpable, equal bilaterally       Labs: For convenience and continuity at follow-up the following most recent labs are provided:      CBC:    Lab Results   Component Value Date    WBC 10.1 08/02/2021    HGB 7.4 08/02/2021    HCT 21.6 08/02/2021     08/02/2021       Renal:    Lab Results   Component Value Date     08/02/2021    K 4.4 08/02/2021    CL 98 08/02/2021    CO2 23 08/02/2021    BUN 23 08/02/2021    CREATININE 0.8 08/02/2021    CALCIUM 8.0 08/02/2021    PHOS 3.6 08/02/2021         Significant Diagnostic Studies    Radiology:   FLUORO FOR SURGICAL PROCEDURES   Final Result      XR HIP LEFT (1 VIEW)   Final Result      XR PELVIS (1-2 VIEWS)   Final Result   Persistent superior dislocation of the right hip prosthesis. XR HIP RIGHT (1 VIEW)   Final Result   Dislocated right hip prosthesis.                 Consults:     IP CONSULT TO ORTHOPEDIC SURGERY  IP CONSULT TO SOCIAL WORK  IP CONSULT TO HOSPICE    Disposition: Long-term care with PT OT    Condition at Discharge: Stable    Discharge Instructions/Follow-up: Orthopedic surgery  PCP    Code Status:  DNR-CCA     Activity: activity as tolerated    Diet: diabetic diet      Discharge Medications:     Current Discharge Medication List           Details   aluminum & magnesium hydroxide-simethicone (MAALOX) 200-200-20 MG/5ML SUSP suspension Take 30 mLs by mouth every 4 hours as needed for Indigestion      sennosides (SENOKOT) 8.8 MG/5ML syrup Take by mouth nightly      !! acetaminophen (TYLENOL) 500 MG tablet Take 500 mg by mouth 3 times daily      !! acetaminophen (TYLENOL) 325 MG tablet Take 650 mg by mouth every 4 hours as needed for Pain or Fever      aspirin 81 MG EC tablet Take 81 mg by mouth daily      atorvastatin (LIPITOR) 80 MG tablet Take 80 mg by mouth daily      bisacodyl (DULCOLAX) 5 MG EC tablet Take 5 mg by mouth daily as needed for Constipation      citalopram (CELEXA) 10 MG tablet Take 10 mg by mouth daily      bisacodyl (DULCOLAX) 10 MG suppository Place 10 mg rectally daily as needed for Constipation      melatonin 3 MG TABS tablet Take 6 mg by mouth nightly as needed      polyethylene glycol (GLYCOLAX) 17 g packet Take 17 g by mouth daily as needed for Constipation      oxyCODONE 5 MG capsule Take 5 mg by mouth every 4 hours as needed for Pain. senna (SENOKOT) 8.6 MG tablet Take by mouth every 12 hours as needed for Constipation      tamsulosin (FLOMAX) 0.4 MG capsule Take 0.4 mg by mouth daily      Multiple Vitamins-Minerals (THERABETIC MULTI-VITAMIN PO) Take 1 tablet by mouth daily       ! ! - Potential duplicate medications found. Please discuss with provider. Time Spent on discharge is more than 30 minutes in the examination, evaluation, counseling and review of medications and discharge plan.       Signed:    Electronically signed by Geovanna Lancaster MD on 8/2/2021 at 4:52 PM

## 2021-08-02 NOTE — PROGRESS NOTES
Occupational Therapy   Occupational Therapy Initial Assessment  Date: 2021   Patient Name: Everett Lutz  MRN: [id number]     : 3/18/1929    Date of Service: 2021    Discharge Recommendations:Baldev Martinez scored a  on the AM-PAC ADL Inpatient form. Current research shows that an AM-PAC score of 17 or less is typically not associated with a discharge to the patient's home setting. Based on the patient's AM-PAC score and their current ADL deficits, it is recommended that the patient have 3-5 sessions per week of Occupational Therapy at d/c to increase the patient's independence. Please see assessment section for further patient specific details. If patient discharges prior to next session this note will serve as a discharge summary. Please see below for the latest assessment towards goals. OT Equipment Recommendations  Equipment Needed: No    Assessment   Performance deficits / Impairments: Decreased functional mobility ; Decreased ADL status; Decreased strength;Decreased safe awareness;Decreased cognition;Decreased endurance;Decreased balance  Treatment Diagnosis: Decreased ADLs, IADLs, transfers, mobility associated with Closed Dislocation of R hip, s/p reduction (placement of abductor brace)  Prognosis: Fair  Decision Making: Medium Complexity  History: dementia, hip sx , from LTC  Exam: as above  Assistance / Modification: dislocation, precautions, abductor brace, assist of 2  OT Education: OT Role;Plan of Care  Patient Education: Eval, discharge recommendations-patient will require reinforcement, daughter verbalized understanding  Barriers to Learning: cognition  REQUIRES OT FOLLOW UP: Yes  Activity Tolerance  Activity Tolerance: Patient limited by fatigue;Patient limited by pain;Treatment limited secondary to decreased cognition  Safety Devices  Safety Devices in place: Yes  Type of devices: All fall risk precautions in place; Bed alarm in place;Call light within reach;Gait belt;Patient at risk for falls; Left in bed;Nurse notified           Patient Diagnosis(es): The encounter diagnosis was Dislocation of hip joint prosthesis, initial encounter ([facility] [address] has a past medical history of CAD (coronary artery disease), Cervical disc disease, Cognitive communication deficit, Dementia ([facility] [address] Diabetes mellitus ([facility] [address] Hip fracture ([facility] [address] Hyperlipidemia, Hypertension, Lack of coordination, Muscle weakness, Other abnormalities of gait and mobility, Spinal stenosis, and Unsteadiness on feet.   has a past surgical history that includes Hip Closed Reduction (Right, 8/1/2021). Treatment Diagnosis: Decreased ADLs, IADLs, transfers, mobility associated with Closed Dislocation of R hip, s/p reduction (placement of abductor brace)      Restrictions  Restrictions/Precautions  Restrictions/Precautions: Fall Risk, ROM Restrictions (High fall risk, regular diet)  Required Braces or Orthoses?: Yes  Required Braces or Orthoses  Right Lower Extremity Brace:  (abductor brace)  Position Activity Restriction  Hip Precautions: No hip flexion > 90 degrees, No hip internal rotation, No ADduction, No ABduction  Other position/activity restrictions: Chalino Ragland is a 80 y.o. male who presented to [facility] ED this morning from a nursing facility after sustaining a right prosthetic hip dislocation. He sustained a right femoral neck fracture in early June 2021 and underwent anterior left total hip arthroplasty with Dr Kennedi Bruce at [facility] at that time. He has been doing well but has had issues with his bowels lately. His daughter believes that he dislocated his hip when getting above follow-up. He stays in a nursing facility and has dementia. He denies any other issues at this time.     Subjective   General  Chart Reviewed: Yes  Family / Caregiver Present: Yes (Daughter)  Diagnosis: Closed dislocation of R hip, s/p reduction  Subjective  Subjective: Patient supine in bed, fatigued but agreeable to evaluation  Patient Currently in Pain: Denies  Pain Assessment  Pain Level: 5  Pre Treatment Pain Screening  Intervention List: Patient able to continue with treatment  Vital Signs  Patient Currently in Pain: Denies  Social/Functional History  Social/Functional History  Type of Home: Facility (SNF vs LTC (unclear from family report))  ADL Assistance: Needs assistance (facility staff assisting with both bathing and dressing)  Ambulation Assistance: Needs assistance (with RW)  Transfer Assistance: Needs assistance (with RW)  Additional Comments: Poor historian due to dementia. Believe pt was receiving therapy services at the facility following his hip sx in June. Using RW for mobility with staff present to assist as needed. Able to feed himself. Objective   Vision: Within Functional Limits  Hearing: Exceptions to [facility]  Hearing Exceptions: Hard of hearing/hearing concerns;Bilateral hearing aid    Orientation  Overall Orientation Status: Impaired  Orientation Level: Oriented to person  Observation/Palpation  Posture: Fair  Balance  Standing Balance: Dependent/Total (Roshni of 2 sit>stand with ht of bed elevated, initial Roshni of 2 stand step bed>recliner (maximal cues for hip precautions) Roshni of 2 progressing to modA of 2 ambulation to toilet, STEDY toilet>bed (fatigue, pain, to maintain precautions))  Standing Balance  Time: ~5-7 minutes total  Activity: stance at toilet (x3) to assist with pericare, RN present x 1-2 attempts (assisting with digital stim, removal of stool manually).  Patient with constant cues for hip precautions, multiple c/o abductor brace pain  Toilet Transfers  Toilet - Technique: Ambulating (Roshni of 2 progressing to modA of 2 into bathroom, dependent via STEDY toilet>bed)  Equipment Used: Extra wide bedside commode (atop toilet)  Toilet Transfer: 2 Person assistance  ADL  LE Bathing: Dependent/Total  LE Dressing: Dependent/Total  Toileting: Dependent/Total (Patient seated on toilet (via RW modA of 2 into bathroom, STEDY exiting bathroom > bed) Dep all toileting tasks, with RN present end of toileting to assist with attempted digital stim and removal of stool)  Tone RUE  RUE Tone: Normotonic  Tone LUE  LUE Tone: Normotonic  Coordination  Movements Are Fluid And Coordinated: Yes     Bed mobility  Supine to Sit: 2 Person assistance (maxA of 2)  Sit to Supine: 2 Person assistance (maxA of 2)  Scooting: Maximal assistance  Comment: Max cues to maintain hip precautions (particularly no flexion >90 degrees)  Transfers  Sit to stand: Dependent/Total;2 Person assistance  Stand to sit: Dependent/Total;2 Person assistance  Vision - Basic Assessment  Patient Visual Report: No visual complaint reported. Cognition  Overall Cognitive Status: Exceptions  Arousal/Alertness: Delayed responses to stimuli  Following Commands: Follows one step commands with increased time; Follows one step commands with repetition  Attention Span: Difficulty dividing attention; Difficulty attending to directions  Memory: Decreased recall of precautions;Decreased recall of recent events;Decreased short term memory  Safety Judgement: Decreased awareness of need for safety;Decreased awareness of need for assistance  Problem Solving: Decreased awareness of errors  Insights: Decreased awareness of deficits  Initiation: Requires cues for some  Sequencing: Requires cues for some  Perception  Overall Perceptual Status: WFL     Sensation  Overall Sensation Status: WFL        LUE AROM (degrees)  LUE AROM : WFL  RUE AROM (degrees)  RUE AROM : WFL  LUE Strength  L Hand General: 5/5  RUE Strength  R Hand General: 5/5                   Plan   Plan  Times per week: 5-7  Times per day: Daily  Current Treatment Recommendations: Strengthening, Balance Training, Functional Mobility Training, Endurance Training, Safety Education & Training, Self-Care / ADL, Cognitive Reorientation, Equipment Evaluation, Education, & procurement, Patient/Caregiver Education & Training, Home Management Training      AM-PAC Score        AM-PAC Inpatient Daily Activity Raw Score: 12 (08/02/21 1604)  AM-PAC Inpatient ADL T-Scale Score : 30.6 (08/02/21 1604)  ADL Inpatient CMS 0-100% Score: 66.57 (08/02/21 1604)  ADL Inpatient CMS G-Code Modifier : CL (08/02/21 1604)    Goals  Short term goals  Time Frame for Short term goals: Discharge  Short term goal 1: Bed mobility modA  Short term goal 2: Functional ADL transfers modA  Short term goal 3: Functional mobility with appropriate AD min to [address] [address] term goal 4: Simple UB ADLs setup  Short term goal 5: Stance x 3 minutes Roshni to assist with ADLs  Long term goals  Time Frame for Long term goals : LTG=STG  Patient Goals   Patient goals : Not stated       Therapy Time   Individual Concurrent Group Co-treatment   Time In 3882         Time Out 1444         Minutes 59              Timed Code Treatment Minutes:  44 Minutes    Total Treatment Minutes:  349 Zaki Pastrana, OTR/L [id number]

## 2021-08-03 ENCOUNTER — APPOINTMENT (OUTPATIENT)
Dept: GENERAL RADIOLOGY | Age: 86
DRG: 560 | End: 2021-08-03
Payer: MEDICARE

## 2021-08-03 LAB
ABO/RH: NORMAL
ANION GAP SERPL CALCULATED.3IONS-SCNC: 8 MMOL/L (ref 3–16)
ANION GAP SERPL CALCULATED.3IONS-SCNC: 8 MMOL/L (ref 3–16)
ANISOCYTOSIS: ABNORMAL
ANTIBODY SCREEN: NORMAL
BASOPHILS ABSOLUTE: 0 K/UL (ref 0–0.2)
BASOPHILS RELATIVE PERCENT: 0 %
BILIRUBIN URINE: ABNORMAL
BLOOD BANK DISPENSE STATUS: NORMAL
BLOOD BANK PRODUCT CODE: NORMAL
BLOOD, URINE: NEGATIVE
BPU ID: NORMAL
BUN BLDV-MCNC: 19 MG/DL (ref 7–20)
BUN BLDV-MCNC: 21 MG/DL (ref 7–20)
CALCIUM SERPL-MCNC: 7.8 MG/DL (ref 8.3–10.6)
CALCIUM SERPL-MCNC: 8 MG/DL (ref 8.3–10.6)
CHLORIDE BLD-SCNC: 96 MMOL/L (ref 99–110)
CHLORIDE BLD-SCNC: 98 MMOL/L (ref 99–110)
CLARITY: ABNORMAL
CO2: 22 MMOL/L (ref 21–32)
CO2: 23 MMOL/L (ref 21–32)
COLOR: ABNORMAL
COMMENT UA: ABNORMAL
CREAT SERPL-MCNC: 0.8 MG/DL (ref 0.8–1.3)
CREAT SERPL-MCNC: 0.8 MG/DL (ref 0.8–1.3)
DESCRIPTION BLOOD BANK: NORMAL
EOSINOPHILS ABSOLUTE: 0 K/UL (ref 0–0.6)
EOSINOPHILS RELATIVE PERCENT: 0 %
EPITHELIAL CELLS, UA: 3 /HPF (ref 0–5)
GFR AFRICAN AMERICAN: >60
GFR AFRICAN AMERICAN: >60
GFR NON-AFRICAN AMERICAN: >60
GFR NON-AFRICAN AMERICAN: >60
GLUCOSE BLD-MCNC: 100 MG/DL (ref 70–99)
GLUCOSE BLD-MCNC: 104 MG/DL (ref 70–99)
GLUCOSE BLD-MCNC: 131 MG/DL (ref 70–99)
GLUCOSE BLD-MCNC: 134 MG/DL (ref 70–99)
GLUCOSE BLD-MCNC: 165 MG/DL (ref 70–99)
GLUCOSE BLD-MCNC: 87 MG/DL (ref 70–99)
GLUCOSE URINE: NEGATIVE MG/DL
HCT VFR BLD CALC: 21 % (ref 40.5–52.5)
HCT VFR BLD CALC: 23.1 % (ref 40.5–52.5)
HEMOGLOBIN: 7 G/DL (ref 13.5–17.5)
HEMOGLOBIN: 7.9 G/DL (ref 13.5–17.5)
HYALINE CASTS: 5 /LPF (ref 0–8)
KETONES, URINE: 15 MG/DL
LEUKOCYTE ESTERASE, URINE: ABNORMAL
LYMPHOCYTES ABSOLUTE: 0.2 K/UL (ref 1–5.1)
LYMPHOCYTES RELATIVE PERCENT: 1 %
MAGNESIUM: 1.9 MG/DL (ref 1.8–2.4)
MCH RBC QN AUTO: 30.9 PG (ref 26–34)
MCH RBC QN AUTO: 31.5 PG (ref 26–34)
MCHC RBC AUTO-ENTMCNC: 33.2 G/DL (ref 31–36)
MCHC RBC AUTO-ENTMCNC: 34.5 G/DL (ref 31–36)
MCV RBC AUTO: 91.3 FL (ref 80–100)
MCV RBC AUTO: 93.1 FL (ref 80–100)
MICROSCOPIC EXAMINATION: YES
MONOCYTES ABSOLUTE: 1.1 K/UL (ref 0–1.3)
MONOCYTES RELATIVE PERCENT: 6 %
NEUTROPHILS ABSOLUTE: 17.2 K/UL (ref 1.7–7.7)
NEUTROPHILS RELATIVE PERCENT: 93 %
NITRITE, URINE: NEGATIVE
PDW BLD-RTO: 16.5 % (ref 12.4–15.4)
PDW BLD-RTO: 17.2 % (ref 12.4–15.4)
PERFORMED ON: ABNORMAL
PH UA: 5.5 (ref 5–8)
PHOSPHORUS: 3.3 MG/DL (ref 2.5–4.9)
PLATELET # BLD: 314 K/UL (ref 135–450)
PLATELET # BLD: 322 K/UL (ref 135–450)
PLATELET SLIDE REVIEW: ADEQUATE
PMV BLD AUTO: 7.1 FL (ref 5–10.5)
PMV BLD AUTO: 7.2 FL (ref 5–10.5)
POTASSIUM SERPL-SCNC: 4.2 MMOL/L (ref 3.5–5.1)
POTASSIUM SERPL-SCNC: 4.9 MMOL/L (ref 3.5–5.1)
PROTEIN UA: ABNORMAL MG/DL
RBC # BLD: 2.26 M/UL (ref 4.2–5.9)
RBC # BLD: 2.53 M/UL (ref 4.2–5.9)
RBC UA: ABNORMAL /HPF (ref 0–4)
SLIDE REVIEW: ABNORMAL
SODIUM BLD-SCNC: 127 MMOL/L (ref 136–145)
SODIUM BLD-SCNC: 128 MMOL/L (ref 136–145)
SPECIFIC GRAVITY UA: 1.02 (ref 1–1.03)
URINE REFLEX TO CULTURE: YES
URINE TYPE: ABNORMAL
UROBILINOGEN, URINE: 1 E.U./DL
WBC # BLD: 16.7 K/UL (ref 4–11)
WBC # BLD: 18.5 K/UL (ref 4–11)
WBC UA: 65 /HPF (ref 0–5)
YEAST: PRESENT /HPF

## 2021-08-03 PROCEDURE — 6360000002 HC RX W HCPCS: Performed by: INTERNAL MEDICINE

## 2021-08-03 PROCEDURE — P9016 RBC LEUKOCYTES REDUCED: HCPCS

## 2021-08-03 PROCEDURE — 85027 COMPLETE CBC AUTOMATED: CPT

## 2021-08-03 PROCEDURE — 87086 URINE CULTURE/COLONY COUNT: CPT

## 2021-08-03 PROCEDURE — 99024 POSTOP FOLLOW-UP VISIT: CPT | Performed by: NURSE PRACTITIONER

## 2021-08-03 PROCEDURE — 36415 COLL VENOUS BLD VENIPUNCTURE: CPT

## 2021-08-03 PROCEDURE — 80048 BASIC METABOLIC PNL TOTAL CA: CPT

## 2021-08-03 PROCEDURE — 2580000003 HC RX 258: Performed by: INTERNAL MEDICINE

## 2021-08-03 PROCEDURE — 85025 COMPLETE CBC W/AUTO DIFF WBC: CPT

## 2021-08-03 PROCEDURE — 1200000000 HC SEMI PRIVATE

## 2021-08-03 PROCEDURE — 86901 BLOOD TYPING SEROLOGIC RH(D): CPT

## 2021-08-03 PROCEDURE — 36430 TRANSFUSION BLD/BLD COMPNT: CPT

## 2021-08-03 PROCEDURE — 86900 BLOOD TYPING SEROLOGIC ABO: CPT

## 2021-08-03 PROCEDURE — 81001 URINALYSIS AUTO W/SCOPE: CPT

## 2021-08-03 PROCEDURE — 94761 N-INVAS EAR/PLS OXIMETRY MLT: CPT

## 2021-08-03 PROCEDURE — 87077 CULTURE AEROBIC IDENTIFY: CPT

## 2021-08-03 PROCEDURE — 6370000000 HC RX 637 (ALT 250 FOR IP): Performed by: INTERNAL MEDICINE

## 2021-08-03 PROCEDURE — 86923 COMPATIBILITY TEST ELECTRIC: CPT

## 2021-08-03 PROCEDURE — 74019 RADEX ABDOMEN 2 VIEWS: CPT

## 2021-08-03 PROCEDURE — 86850 RBC ANTIBODY SCREEN: CPT

## 2021-08-03 PROCEDURE — 87186 SC STD MICRODIL/AGAR DIL: CPT

## 2021-08-03 PROCEDURE — 83735 ASSAY OF MAGNESIUM: CPT

## 2021-08-03 PROCEDURE — 87040 BLOOD CULTURE FOR BACTERIA: CPT

## 2021-08-03 PROCEDURE — 84100 ASSAY OF PHOSPHORUS: CPT

## 2021-08-03 RX ORDER — SODIUM CHLORIDE 9 MG/ML
INJECTION, SOLUTION INTRAVENOUS PRN
Status: DISCONTINUED | OUTPATIENT
Start: 2021-08-03 | End: 2021-08-06 | Stop reason: HOSPADM

## 2021-08-03 RX ADMIN — ENOXAPARIN SODIUM 40 MG: 40 INJECTION SUBCUTANEOUS at 08:54

## 2021-08-03 RX ADMIN — FAMOTIDINE 20 MG: 20 TABLET ORAL at 08:55

## 2021-08-03 RX ADMIN — ATORVASTATIN CALCIUM 80 MG: 40 TABLET, FILM COATED ORAL at 20:36

## 2021-08-03 RX ADMIN — CITALOPRAM HYDROBROMIDE 10 MG: 20 TABLET ORAL at 08:56

## 2021-08-03 RX ADMIN — Medication 10 ML: at 20:39

## 2021-08-03 RX ADMIN — ASPIRIN 81 MG: 81 TABLET, COATED ORAL at 08:55

## 2021-08-03 RX ADMIN — MELATONIN TAB 3 MG 6 MG: 3 TAB at 20:36

## 2021-08-03 RX ADMIN — Medication 1000 MG: at 18:36

## 2021-08-03 RX ADMIN — TAMSULOSIN HYDROCHLORIDE 0.4 MG: 0.4 CAPSULE ORAL at 08:56

## 2021-08-03 ASSESSMENT — PAIN SCALES - GENERAL
PAINLEVEL_OUTOF10: 0

## 2021-08-03 NOTE — PROGRESS NOTES
Pr c/o pressure pain on right hip/leg from Brace. initially tried to pad and reposition brace, pt eventually demanding it is removed. As discussed with NP Giuseppe Acosta placed abductor pillow (see notes). Pt tolerated well. Has had several small loose stools today. Abdominal xray delayed as pt sent them away as he needed to be on bed pan.

## 2021-08-03 NOTE — PROGRESS NOTES
Morning assessment completed, VSS, care plan educated with pt. Verbalizes understanding. Denies pain at this time, will continue to monitor, call light within reach.

## 2021-08-03 NOTE — PROGRESS NOTES
[facility] Orthopedic Surgery   Progress Note    CHIEF COMPLAINT/DIAGNOSIS: S/p RIGHT hip closed reduction     SUBJECTIVE: The patient is sitting up in the bed;  Daughter at bedside describes no hip pain. Pleasantly confused. Oriented to person only. Denies pain. He is wearing hip abduction brace. Leukocytosis again noted; remains afebrile. Denies any new symptoms. He has continued pulling on his catheter. Supposed to see Urology next week about doing voiding trial per his daughter vs proceeding with suprapubic catheter. Underwent index RIGHT JUAN RAMON for femoral neck fracture on 6/6/21 with Dr. Haley Anderson. OBJECTIVE  Physical    VITALS:  BP (!) 100/45   Pulse 73   Temp 98.9 °F (37.2 °C) (Oral)   Resp 17   Ht 5' 11\" (1.803 m)   Wt 159 lb (72.1 kg)   SpO2 96%   BMI 22.18 kg/m²     GENERAL: Alert and oriented x1, in no acute distress. MUSCULOSKELETAL: Able to dorsi and plantarflex the ankle on right side without issue. INCISION:  Anterior healed. ROM: Deferred  Sensory:  Intact to light touch in peroneal and tibial distributions. Vascular:   2+ DP pulses with brisk cap refill;  calf soft and nontender. Data    ALL MEDICATIONS HAVE BEEN REVIEWED    CBC:   Recent Labs     08/01/21  0747 08/02/21  0533 08/03/21  0638   WBC 16.2* 10.1 18.5*   HGB 8.2* 7.4* 7.0*   HCT 24.4* 21.6* 21.0*    305 322     BMP:   Recent Labs     08/01/21  0747 08/02/21  0533 08/03/21  0638   * 128* 127*   K 4.1 4.4 4.2   CL 93* 98* 96*   CO2 20* 23 23   PHOS  --  3.6 3.3   BUN 29* 23* 21*   CREATININE 0.9 0.8 0.8     INR: No results for input(s): INR in the last 72 hours. ASSESSMENT:  S/p LEFT hip closed reduction (8/1/21)  Dementia  DM II  Hyponatremia  Anemia    PLAN:   I had a lengthy discussion today and yesterday with the patient's daughters/POA. They do not believe he is willing/able to tolerate the hip abduction brace.   They wish to proceed with using the hip abduction pillow whenever he is not ambulating instead. They are aware that this puts him at a higher risk for recurrent dislocations and wish to proceed. - WB status:  WBAT left leg; posterior hip precautions; ABD pillow in place whenver not ambulating  - DVT prophylaxis: OK for lovenox as inpt per primary team.   - PT/OT  - Anemia: Hgb:7; one unit PRBC ordered  - Pain Control: Tylenol prn.   - Disposition: OK to d/c back to SNF from our end. Follow-up in 2-4 weeks with Dr. Angelita Lee [facility]). Alyssa Aponte, SAADIA - CNP  8/3/2021  12:15 PM    I have discussed with the patient and daughter/POA the rationale for blood component transfusion; its benefits in treating or preventing fatigue, organ damage, or death; and its risk which includes mild transfusion reactions, rare risk of blood borne infection, or more serious but rare reactions. I have discussed the alternatives to transfusion, including the risk and consequences of not receiving transfusion. The patient and daughter/POA had an opportunity to ask questions and had agreed to proceed with transfusion of blood components.

## 2021-08-03 NOTE — PROGRESS NOTES
HOSPITALISTS PROGRESS NOTE    8/3/2021 9:43 AM        Name: Yesi Hatr . Admitted: 8/1/2021  Primary Care Provider: Jessa Teresa (Tel: [phone])      CC:    Brief Course: This 80 y. o. male with past medical history of recent hip fracture, status post hip arthroplasty, history of CAD, advanced dementia, diabetes mellitus, BPH presents from nursing facility in view of hip dislocation. Patient status post OR for closed reduction of right hip with orthopedic surgery on 8/2/20121. Medicine consulted for admission. Patient does not provide good history in view of dementia    Interval history:   Pt seen and examined today   Overnight events noted and interval ancillary notes  Pt endorsed hip pain denied any fevers, chills, chest pain, palpitations, cough, SOB  Hemoglobin down to 7 this morning; 1 unit of packed RBC ordered    Assessment & Plan:   Close dislocation of right hip  Status post reduction  Pain management per orthopedic surgery  PT     Leukocytosis; likely secondary to UTI  Blood and urine culture ordered  Patient has a chronic Patel's  Urine analysis suggestive of UTI; started empiric ceftriaxone awaiting culture     Hyponatremia  Follow-up sodium level morning 8/3/2021 prior to discharge  Patient provided with prescription from [facility] to be performed 8/5/2021  Free water restriction 1800 cc daily  Nephrology consulted     CAD  Asymptomatic  Continue aspirin and statin     BPH  Continue Flomax     Diabetes mellitus  Not currently on any antihyperglycemic's  Carb controlled diet  POCT AC at bedtime  Sliding scale insulin    DVT PPX: Lovenox  Code:DNR-CCA  Diet: ADULT DIET;  Regular; 5 carb choices (75 gm/meal); 1800 ml    Disposition: Discharge home tomorrow if medically stable    Current Medications  0.9 % sodium chloride infusion, PRN  Fixodent Complete CREA 1 applicator, Daily PRN  oxyCODONE-acetaminophen (PERCOCET) 5-325 MG per tablet 1 tablet, Once  sodium chloride flush 0.9 % injection 5-40 mL, 2 times per day  sodium chloride flush 0.9 % injection 5-40 mL, PRN  0.9 % sodium chloride infusion, PRN  enoxaparin (LOVENOX) injection 40 mg, Daily  ondansetron (ZOFRAN-ODT) disintegrating tablet 4 mg, Q8H PRN   Or  ondansetron (ZOFRAN) injection 4 mg, Q6H PRN  polyethylene glycol (GLYCOLAX) packet 17 g, Daily PRN  acetaminophen (TYLENOL) tablet 650 mg, Q6H PRN   Or  acetaminophen (TYLENOL) suppository 650 mg, Q6H PRN  potassium chloride (KLOR-CON M) extended release tablet 40 mEq, PRN   Or  potassium bicarb-citric acid (EFFER-K) effervescent tablet 40 mEq, PRN   Or  potassium chloride 10 mEq/100 mL IVPB (Peripheral Line), PRN  magnesium sulfate 2000 mg in 50 mL IVPB premix, PRN  famotidine (PEPCID) tablet 20 mg, Daily  aspirin EC tablet 81 mg, Daily  atorvastatin (LIPITOR) tablet 80 mg, Nightly  bisacodyl (DULCOLAX) suppository 10 mg, Daily PRN  bisacodyl (DULCOLAX) EC tablet 5 mg, Daily PRN  citalopram (CELEXA) tablet 10 mg, Daily  melatonin tablet 6 mg, Nightly PRN  tamsulosin (FLOMAX) capsule 0.4 mg, Daily  HYDROmorphone HCl PF (DILAUDID) injection 0.25 mg, Q3H PRN   Or  HYDROmorphone HCl PF (DILAUDID) injection 0.5 mg, Q3H PRN  oxyCODONE-acetaminophen (PERCOCET) 5-325 MG per tablet 1 tablet, Q4H PRN  insulin lispro (1 Unit Dial) 0-6 Units, TID WC  insulin lispro (1 Unit Dial) 0-3 Units, Nightly  glucose (GLUTOSE) 40 % oral gel 15 g, PRN  dextrose 50 % IV solution, PRN  glucagon (rDNA) injection 1 mg, PRN  dextrose 5 % solution, PRN        Objective:  BP (!) 153/62   Pulse 83   Temp 97.9 °F (36.6 °C) (Oral)   Resp 18   Ht 5' 11\" (1.803 m)   Wt 159 lb (72.1 kg)   SpO2 94%   BMI 22.18 kg/m²     Intake/Output Summary (Last 24 hours) at 8/3/2021 0943  Last data filed at 8/3/2021 0433  Gross per 24 hour   Intake --   Output 400 ml   Net -400 ml      Wt Readings from Last 3 Encounters:   08/01/21 159 lb (72.1 kg)   12/12/19 186 lb 4.6 oz (84.5 kg)       Physical Examination:   General appearance: No apparent distress appears stated age and cooperative. HEENT Normal cephalic, atraumatic without obvious deformity. Pupils equal, round, and reactive to light. Extra ocular muscles intact. Conjunctivae/corneas clear. Lungs: Clear to auscultation, bilaterally without Rales/Wheezes/Rhonchi with good respiratory effort. Heart: Regular rate and rhythm with Normal S1/S2 without murmurs, rubs or gallops, point of maximum impulse non-displaced  Abdomen: Soft, non-tender or non-distended without rigidity or guarding and positive bowel sounds all four quadrants. Extremities: No clubbing, cyanosis, or edema bilaterally. Full range of motion without deformity and normal gait intact. Neurologic: Alert and oriented X 3, neurovascularly intact with sensory/motor intact upper extremities/lower extremities, bilaterally. Cranial nerves: II-XII intact, grossly non-focal.  Psychiatry: Appropriate affect. Not agitated  Skin:  Patient has wound to left heel that was present on admission. Wound is 4x 3.5 cm, wound bed covered with dry slough and eschar. Zoraida wound is non blanchable red, dried yellow slough and devitalized tissue. There is no odor and patient has no report of pain. Will request santyl to wound, cover with saline moistened gauze and foam dressing. Discussed case with nurse Roxane Fletcher, float heels off of bed. Patient also has abrasions to buttocks area, and various small abrasions to bilateral legs that are scabbed over. There is one open wound to posterior left leg with pink wound bed, no swelling or drainage noted. Will continue to use apply Triad ointment and  foam dressings to buttocks, left foot  and left leg wounds.      Labs and Tests:  CBC:   Recent Labs     08/01/21  0747 08/02/21  0533 08/03/21  0638   WBC 16.2* 10.1 18.5*   HGB 8.2* 7.4* 7.0*    305 322     BMP:    Recent Labs     08/01/21  0747 08/02/21  0533 08/03/21  4843 * 128* 127*   K 4.1 4.4 4.2   CL 93* 98* 96*   CO2 20* 23 23   BUN 29* 23* 21*   CREATININE 0.9 0.8 0.8   GLUCOSE 102* 94 87     Hepatic: No results for input(s): AST, ALT, ALB, BILITOT, ALKPHOS in the last 72 hours. FLUORO FOR SURGICAL PROCEDURES   Final Result      XR HIP LEFT (1 VIEW)   Final Result      XR PELVIS (1-2 VIEWS)   Final Result   Persistent superior dislocation of the right hip prosthesis. XR HIP RIGHT (1 VIEW)   Final Result   Dislocated right hip prosthesis. Problem List  Active Problems:    Hip dislocation, right, initial encounter ([facility] [address]    Closed dislocation of right hip ([address]  Resolved Problems:    * No resolved hospital problems.  Fransico Paz MD   8/3/2021 9:43 AM

## 2021-08-04 LAB
ANION GAP SERPL CALCULATED.3IONS-SCNC: 7 MMOL/L (ref 3–16)
BASOPHILS ABSOLUTE: 0.1 K/UL (ref 0–0.2)
BASOPHILS RELATIVE PERCENT: 0.4 %
BILIRUBIN URINE: ABNORMAL
BLOOD, URINE: ABNORMAL
BUN BLDV-MCNC: 17 MG/DL (ref 7–20)
CALCIUM SERPL-MCNC: 7.7 MG/DL (ref 8.3–10.6)
CHLORIDE BLD-SCNC: 97 MMOL/L (ref 99–110)
CLARITY: ABNORMAL
CO2: 23 MMOL/L (ref 21–32)
COLOR: ABNORMAL
COMMENT UA: ABNORMAL
CREAT SERPL-MCNC: 0.8 MG/DL (ref 0.8–1.3)
CREATININE URINE: 81.4 MG/DL (ref 39–259)
EOSINOPHILS ABSOLUTE: 0.1 K/UL (ref 0–0.6)
EOSINOPHILS RELATIVE PERCENT: 0.9 %
EPITHELIAL CELLS, UA: 0 /HPF (ref 0–5)
GFR AFRICAN AMERICAN: >60
GFR NON-AFRICAN AMERICAN: >60
GLUCOSE BLD-MCNC: 106 MG/DL (ref 70–99)
GLUCOSE BLD-MCNC: 116 MG/DL (ref 70–99)
GLUCOSE BLD-MCNC: 125 MG/DL (ref 70–99)
GLUCOSE BLD-MCNC: 127 MG/DL (ref 70–99)
GLUCOSE BLD-MCNC: 191 MG/DL (ref 70–99)
GLUCOSE URINE: NEGATIVE MG/DL
HCT VFR BLD CALC: 22.2 % (ref 40.5–52.5)
HEMOGLOBIN: 7.5 G/DL (ref 13.5–17.5)
HYALINE CASTS: ABNORMAL /LPF (ref 0–2)
IRON SATURATION: 21 % (ref 20–50)
IRON: 24 UG/DL (ref 59–158)
KETONES, URINE: ABNORMAL MG/DL
LEUKOCYTE ESTERASE, URINE: ABNORMAL
LYMPHOCYTES ABSOLUTE: 0.9 K/UL (ref 1–5.1)
LYMPHOCYTES RELATIVE PERCENT: 6.1 %
MAGNESIUM: 1.9 MG/DL (ref 1.8–2.4)
MCH RBC QN AUTO: 31 PG (ref 26–34)
MCHC RBC AUTO-ENTMCNC: 33.7 G/DL (ref 31–36)
MCV RBC AUTO: 91.9 FL (ref 80–100)
MICROSCOPIC EXAMINATION: YES
MONOCYTES ABSOLUTE: 1.3 K/UL (ref 0–1.3)
MONOCYTES RELATIVE PERCENT: 9.1 %
NEUTROPHILS ABSOLUTE: 12.1 K/UL (ref 1.7–7.7)
NEUTROPHILS RELATIVE PERCENT: 83.5 %
NITRITE, URINE: NEGATIVE
PDW BLD-RTO: 16.7 % (ref 12.4–15.4)
PERFORMED ON: ABNORMAL
PH UA: 5.5 (ref 5–8)
PHOSPHORUS: 2.8 MG/DL (ref 2.5–4.9)
PLATELET # BLD: 285 K/UL (ref 135–450)
PMV BLD AUTO: 6.9 FL (ref 5–10.5)
POTASSIUM SERPL-SCNC: 4.1 MMOL/L (ref 3.5–5.1)
PROTEIN UA: 30 MG/DL
RBC # BLD: 2.42 M/UL (ref 4.2–5.9)
RBC UA: ABNORMAL /HPF (ref 0–4)
SARS-COV-2, NAAT: NOT DETECTED
SODIUM BLD-SCNC: 127 MMOL/L (ref 136–145)
SODIUM URINE: 103 MMOL/L
SPECIFIC GRAVITY UA: 1.03 (ref 1–1.03)
TOTAL IRON BINDING CAPACITY: 112 UG/DL (ref 260–445)
URINE REFLEX TO CULTURE: YES
URINE TYPE: ABNORMAL
UROBILINOGEN, URINE: 1 E.U./DL
WBC # BLD: 14.5 K/UL (ref 4–11)
WBC UA: 77 /HPF (ref 0–5)
YEAST: PRESENT /HPF

## 2021-08-04 PROCEDURE — 6360000002 HC RX W HCPCS: Performed by: INTERNAL MEDICINE

## 2021-08-04 PROCEDURE — 1200000000 HC SEMI PRIVATE

## 2021-08-04 PROCEDURE — 6370000000 HC RX 637 (ALT 250 FOR IP): Performed by: INTERNAL MEDICINE

## 2021-08-04 PROCEDURE — 82570 ASSAY OF URINE CREATININE: CPT

## 2021-08-04 PROCEDURE — 81001 URINALYSIS AUTO W/SCOPE: CPT

## 2021-08-04 PROCEDURE — 83550 IRON BINDING TEST: CPT

## 2021-08-04 PROCEDURE — 97530 THERAPEUTIC ACTIVITIES: CPT

## 2021-08-04 PROCEDURE — 2580000003 HC RX 258: Performed by: INTERNAL MEDICINE

## 2021-08-04 PROCEDURE — 83735 ASSAY OF MAGNESIUM: CPT

## 2021-08-04 PROCEDURE — 87635 SARS-COV-2 COVID-19 AMP PRB: CPT

## 2021-08-04 PROCEDURE — 84300 ASSAY OF URINE SODIUM: CPT

## 2021-08-04 PROCEDURE — 36415 COLL VENOUS BLD VENIPUNCTURE: CPT

## 2021-08-04 PROCEDURE — 85025 COMPLETE CBC W/AUTO DIFF WBC: CPT

## 2021-08-04 PROCEDURE — 94760 N-INVAS EAR/PLS OXIMETRY 1: CPT

## 2021-08-04 PROCEDURE — 80048 BASIC METABOLIC PNL TOTAL CA: CPT

## 2021-08-04 PROCEDURE — 83540 ASSAY OF IRON: CPT

## 2021-08-04 PROCEDURE — 84100 ASSAY OF PHOSPHORUS: CPT

## 2021-08-04 PROCEDURE — 99223 1ST HOSP IP/OBS HIGH 75: CPT | Performed by: INTERNAL MEDICINE

## 2021-08-04 PROCEDURE — 87086 URINE CULTURE/COLONY COUNT: CPT

## 2021-08-04 RX ORDER — SODIUM CHLORIDE 1000 MG
1 TABLET, SOLUBLE MISCELLANEOUS
Status: DISCONTINUED | OUTPATIENT
Start: 2021-08-04 | End: 2021-08-06 | Stop reason: HOSPADM

## 2021-08-04 RX ORDER — FLUCONAZOLE 100 MG/1
100 TABLET ORAL DAILY
Status: DISCONTINUED | OUTPATIENT
Start: 2021-08-04 | End: 2021-08-06 | Stop reason: HOSPADM

## 2021-08-04 RX ADMIN — CITALOPRAM HYDROBROMIDE 10 MG: 20 TABLET ORAL at 09:11

## 2021-08-04 RX ADMIN — SODIUM CHLORIDE TAB 1 GM 1 G: 1 TAB at 18:04

## 2021-08-04 RX ADMIN — FLUCONAZOLE 100 MG: 100 TABLET ORAL at 15:28

## 2021-08-04 RX ADMIN — INSULIN LISPRO 1 UNITS: 100 INJECTION, SOLUTION INTRAVENOUS; SUBCUTANEOUS at 20:32

## 2021-08-04 RX ADMIN — ATORVASTATIN CALCIUM 80 MG: 40 TABLET, FILM COATED ORAL at 20:29

## 2021-08-04 RX ADMIN — FAMOTIDINE 20 MG: 20 TABLET ORAL at 09:11

## 2021-08-04 RX ADMIN — Medication 10 ML: at 09:12

## 2021-08-04 RX ADMIN — TAMSULOSIN HYDROCHLORIDE 0.4 MG: 0.4 CAPSULE ORAL at 09:11

## 2021-08-04 RX ADMIN — OXYCODONE HYDROCHLORIDE AND ACETAMINOPHEN 1 TABLET: 5; 325 TABLET ORAL at 09:10

## 2021-08-04 RX ADMIN — ACETAMINOPHEN 650 MG: 325 TABLET ORAL at 13:36

## 2021-08-04 RX ADMIN — ENOXAPARIN SODIUM 40 MG: 40 INJECTION SUBCUTANEOUS at 09:11

## 2021-08-04 RX ADMIN — SODIUM CHLORIDE TAB 1 GM 1 G: 1 TAB at 13:36

## 2021-08-04 RX ADMIN — Medication 1000 MG: at 18:04

## 2021-08-04 RX ADMIN — ASPIRIN 81 MG: 81 TABLET, COATED ORAL at 09:10

## 2021-08-04 RX ADMIN — HYDROMORPHONE HYDROCHLORIDE 0.5 MG: 1 INJECTION, SOLUTION INTRAMUSCULAR; INTRAVENOUS; SUBCUTANEOUS at 02:34

## 2021-08-04 ASSESSMENT — PAIN SCALES - GENERAL
PAINLEVEL_OUTOF10: 9
PAINLEVEL_OUTOF10: 0
PAINLEVEL_OUTOF10: 8

## 2021-08-04 ASSESSMENT — PAIN DESCRIPTION - LOCATION
LOCATION: HEAD;NECK
LOCATION: HEAD;NECK
LOCATION: HEAD

## 2021-08-04 ASSESSMENT — PAIN DESCRIPTION - ORIENTATION
ORIENTATION: POSTERIOR

## 2021-08-04 ASSESSMENT — PAIN DESCRIPTION - ONSET
ONSET: ON-GOING
ONSET: ON-GOING

## 2021-08-04 ASSESSMENT — PAIN DESCRIPTION - PAIN TYPE
TYPE: ACUTE PAIN

## 2021-08-04 ASSESSMENT — PAIN DESCRIPTION - FREQUENCY
FREQUENCY: CONTINUOUS
FREQUENCY: CONTINUOUS

## 2021-08-04 ASSESSMENT — PAIN DESCRIPTION - PROGRESSION
CLINICAL_PROGRESSION: NOT CHANGED
CLINICAL_PROGRESSION: NOT CHANGED

## 2021-08-04 NOTE — PROGRESS NOTES
Urine sample obtained from chronic catheter and sent to labeled with date and time.   Carolann Welch RN

## 2021-08-04 NOTE — PROGRESS NOTES
Patient incontinent of BM, patient given bed bath and clean gown, complete bed linen change. Therapy in to get patient OOB.   Richi Huston RN

## 2021-08-04 NOTE — PROGRESS NOTES
Physical Therapy  Facility/Department: [address] ORTHO/NEURO NURSING  Daily Treatment Note  NAME: Jb Dyson  : 3/18/1929  MRN: [id number]    Date of Service: 2021    Discharge Recommendations:Baldev Cruz scored a /24 on the AM-PAC short mobility form. Current research shows that an AM-PAC score of 17 or less is typically not associated with a discharge to the patient's home setting. Based on the patient's AM-PAC score and their current functional mobility deficits, it is recommended that the patient have 3-5 sessions per week of Physical Therapy at d/c to increase the patient's independence. Please see assessment section for further patient specific details. If patient discharges prior to next session this note will serve as a discharge summary. Please see below for the latest assessment towards goals. 3-5 sessions per week   PT Equipment Recommendations  Equipment Needed:  (daughter states pt has RW)    Assessment   Body structures, Functions, Activity limitations: Decreased functional mobility ; Decreased ADL status; Decreased balance;Decreased safe awareness;Decreased cognition;Decreased posture  Assessment: Pt presenting with above deficits following R hip dislocation/relocation, pt refused use of abductor brace but agreed to use hip abductor pillow. 2 person max assist for all transfers and mobility. Pt has dementia and has limited cognitive ability to comprehend needs for brace or current hip precautions. pt daughter reported pt has been constipated for some time, PT and SPTA transferred pt to [facility], nurse provided disimpaction of fecal material from pt. Pericare for cleanup. transferred pt to recliner and applied hot pack to pt neck. continued skilled PT needed to promote return towards PLOF. Prognosis: Fair  Decision Making: Medium Complexity  PT Education: PT Role;Transfer Training;Precautions; Functional Mobility Training  Patient Education: need reinforcement  Barriers to Learning: cognition, hearing  REQUIRES PT FOLLOW UP: Yes  Activity Tolerance  Activity Tolerance: Patient limited by pain  Activity Tolerance: pt constantly complaining about head/neck pain/headache and needing to pass BM. Patient Diagnosis(es): The primary encounter diagnosis was Dislocation of hip joint prosthesis, initial encounter ([facility] [address] A diagnosis of Hyponatremia was also pertinent to this visit. has a past medical history of CAD (coronary artery disease), Cervical disc disease, Cognitive communication deficit, Dementia ([facility] [address] Diabetes mellitus ([facility] [address] Hip fracture ([facility] [address] Hyperlipidemia, Hypertension, Lack of coordination, Muscle weakness, Other abnormalities of gait and mobility, Spinal stenosis, and Unsteadiness on feet.   has a past surgical history that includes Hip Closed Reduction (Right, 8/1/2021). Restrictions  Restrictions/Precautions  Restrictions/Precautions: Fall Risk, ROM Restrictions  Required Braces or Orthoses?: Yes (hip abduction pillow placed between legs)  Required Braces or Orthoses  Right Lower Extremity Brace:  (hip abductor pillow)  Position Activity Restriction  Hip Precautions: No hip flexion > 90 degrees, No hip internal rotation, No ADduction, No ABduction  Other position/activity restrictions: Zack Barrios is a 80 y.o. male who presented to [facility] ED on 08/01 from a nursing facility after sustaining a right prosthetic hip dislocation. He sustained a right femoral neck fracture in early June 2021 and underwent anterior left total hip arthroplasty with Dr Angelita Lee at [facility] at that time. He has been doing well but has had issues with his bowels lately. His daughter believes that he dislocated his hip when getting above follow-up. He stays in a nursing facility and has dementia. He denies any other issues at this time.   Subjective   General  Chart Reviewed: Yes  Family / Caregiver Present: Yes  Subjective  Subjective: pt complaining of intense pain back of head/neck area (did not rate). states he 'wants god to take him' shouts out in pain when moved by PT/OT. Pt states he needs to poop 'real bad' nursing staff cleaning up bowel movement while pt supine. General Comment  Comments: supine in bed at arrival  Pain Assessment  Pain Assessment: 0-10  Pain Level: 9  Patient's Stated Pain Goal: No pain  Pain Type: Acute pain  Pain Location: Head;Neck  Pain Orientation: Posterior  Pain Descriptors: Sharp;Pounding; Headache  Pain Frequency: Continuous  Pain Onset: On-going  Clinical Progression: Not changed  Vital Signs  Level of Consciousness: Alert (0)  Oxygen Therapy  O2 Device: None (Room air)       Orientation  Orientation  Overall Orientation Status: Impaired  Orientation Level: Oriented to person  Cognition   Cognition  Overall Cognitive Status: Exceptions  Arousal/Alertness: Delayed responses to stimuli  Following Commands: Follows one step commands with increased time; Follows one step commands with repetition  Attention Span: Difficulty dividing attention; Difficulty attending to directions  Memory: Decreased recall of precautions;Decreased recall of recent events;Decreased short term memory  Safety Judgement: Decreased awareness of need for safety;Decreased awareness of need for assistance  Problem Solving: Decreased awareness of errors  Insights: Decreased awareness of deficits  Initiation: Requires cues for some  Sequencing: Requires cues for some  Objective   Bed mobility  Supine to Sit: 2 Person assistance;Maximum assistance  Sit to Supine: 2 Person assistance;Maximum assistance  Scootin Person assistance;Maximal assistance  Comment: max cues to maintain hip precautions (no flexion>90 degrees, no internal rotation no abduction)  Transfers  Sit to Stand:  (max A of 2, transferred pt to [facility].  sit to stand x5 for pericare)  Stand to sit: 2 Person Assistance;Maximum Assistance (max A 2 person, lowering to [facility] max cues for hand placement and sitting down)  Bed Therapy Time   Individual Concurrent Group Co-treatment   Time In 2462         Time Out 1440         Minutes 54         Timed Code Treatment Minutes: 50 Minutes (billed as such, as some time spent awaiting response to moist hot pack prior to returning to assess response)     OSMAR Melton  PT providing direct supervision during session and assisting in making skilled judgements throughout session.   Dante Fowler, PT, DPT, [id number]  Dante Fowler, PT

## 2021-08-04 NOTE — PROGRESS NOTES
Comprehensive Nutrition Assessment    Type and Reason for Visit:  Initial, Wound    Nutrition Recommendations/Plan:   1. Glucerna TID  2. Staff to obtain current weight to better assess nutrition status    Nutrition Assessment:  Pt is nutritionally compromised AEB report of poor po intake with increased nutrient needs for wound healing. Per RN, pt is eating less than 25% of meals. Unable to determine wt changes as admission wt is stated & there is no wt hx to review. Unable to speak with pt d/t severe dementia. Malnutrition Assessment:  Malnutrition Status:  Insufficient data      Estimated Daily Nutrient Needs:  Energy (kcal):  1800- 2160 (25-30 kcal/72kg); Weight Used for Energy Requirements:  Current     Protein (g):  86- 108g (1.2-1.5g/72kg); Weight Used for Protein Requirements:  Current        Fluid (ml/day):   ; Method Used for Fluid Requirements:  1 ml/kcal      Nutrition Related Findings:  LBM 8/2; -1.6L      Wounds:  Surgical Incision       Current Nutrition Therapies:    ADULT DIET; Regular; 5 carb choices (75 gm/meal); 1800 ml    Anthropometric Measures:  · Height: 5' 11\" (180.3 cm)  · Current Body Weight: 159 lb (72.1 kg)   · Admission Body Weight:      · Usual Body Weight:       · Ideal Body Weight: 172 lbs; % Ideal Body Weight 92.4 %   · BMI: 22.2  · Adjusted Body Weight:  ; No Adjustment   · Adjusted BMI:      · BMI Categories: Normal Weight (BMI 18.5-24. 9)       Nutrition Diagnosis:   · Inadequate oral intake related to inadequate protein-energy intake as evidenced by intake 0-25%    · Increased nutrient needs related to increase demand for energy/nutrients as evidenced by wounds      Nutrition Interventions:   Food and/or Nutrient Delivery:  Continue Current Diet, Start Oral Nutrition Supplement  Nutrition Education/Counseling:  Education not indicated   Coordination of Nutrition Care:  Continue to monitor while inpatient    Goals:  po intake at least 50% of meals & supplements Nutrition Monitoring and Evaluation:   Behavioral-Environmental Outcomes:  None Identified   Food/Nutrient Intake Outcomes:  Food and Nutrient Intake, Supplement Intake  Physical Signs/Symptoms Outcomes:  Weight, Skin     Discharge Planning:     Too soon to determine     Electronically signed by Gricel Avendano RD, LD on 8/4/21 at 2:06 PM EDT    Contact: [phone]

## 2021-08-04 NOTE — PROGRESS NOTES
HOSPITALISTS PROGRESS NOTE    8/4/2021 1:13 PM        Name: Selvin Swift . Admitted: 8/1/2021  Primary Care Provider: [facility] (Tel: [phone])      CC: Hip dislocation    Brief Course: This 80 y. o. male with past medical history of recent hip fracture, status post hip arthroplasty, history of CAD, advanced dementia, diabetes mellitus, BPH presents from nursing facility in view of hip dislocation. Patient status post OR for closed reduction of right hip with orthopedic surgery on 8/2/20121. Medicine consulted for admission. Patient does not provide good history in view of dementia    Interval history:   Patient seen and examined today  Overnight events and interval ancillary notes removed  S/p 1 unit of packed RBC; Hgb seven-point this morning  Iron studies ordered  Started on sodium tabs per nephrology        Assessment & Plan:   Close dislocation of right hip  Status post reduction  Pain management per orthopedic surgery     Leukocytosis; likely secondary to UTI; trending down  Blood and urine culture ordered  Patient has a chronic Patel's  Urine analysis suggestive of UTI; started empiric ceftriaxone awaiting culture    UTI: Urine suggestive of infection  Started on ceftriaxone and Diflucan for 4 days  Patel exchanged; repeat urine analysis order    Hx of BPH catheter dependent on the last 3-month  Follows with urology status post cystoscopy/UDS on 7/2021 which relieved obstructive lateral prostatic lobes with a narrow bladder neck.  UDS results suggest incomplete bladder emptying possibly 2/2 to chronic bladder outlet obstruction from BPH, some detrusor muscle underactively; plan for TURP by Dr. Elmer Lainez        Hyponatremia: Chronic  Follow-up sodium level morning 8/3/2021 prior to discharge  Patient provided with prescription from [facility] to be performed 8/5/2021  Free water restriction 1800 cc daily  Nephrology on board; started on sodium tabs     CAD  Asymptomatic  Continue aspirin and statin     BPH  Continue Flomax     Diabetes mellitus  Not currently on any antihyperglycemic's  Carb controlled diet  POCT AC at bedtime  Sliding scale insulin    DVT PPX: Lovenox  Code:DNR-CCA  Diet: ADULT DIET;  Regular; 5 carb choices (75 gm/meal); 1800 ml    Disposition: Discharge home tomorrow if medically stable    Current Medications  sodium chloride tablet 1 g, TID WC  fluconazole (DIFLUCAN) tablet 100 mg, Daily  0.9 % sodium chloride infusion, PRN  cefTRIAXone (ROCEPHIN) 1000 mg in sterile water 10 mL IV syringe, Q24H  Fixodent Complete CREA 1 applicator, Daily PRN  oxyCODONE-acetaminophen (PERCOCET) 5-325 MG per tablet 1 tablet, Once  sodium chloride flush 0.9 % injection 5-40 mL, 2 times per day  sodium chloride flush 0.9 % injection 5-40 mL, PRN  0.9 % sodium chloride infusion, PRN  enoxaparin (LOVENOX) injection 40 mg, Daily  ondansetron (ZOFRAN-ODT) disintegrating tablet 4 mg, Q8H PRN   Or  ondansetron (ZOFRAN) injection 4 mg, Q6H PRN  polyethylene glycol (GLYCOLAX) packet 17 g, Daily PRN  acetaminophen (TYLENOL) tablet 650 mg, Q6H PRN   Or  acetaminophen (TYLENOL) suppository 650 mg, Q6H PRN  potassium chloride (KLOR-CON M) extended release tablet 40 mEq, PRN   Or  potassium bicarb-citric acid (EFFER-K) effervescent tablet 40 mEq, PRN   Or  potassium chloride 10 mEq/100 mL IVPB (Peripheral Line), PRN  magnesium sulfate 2000 mg in 50 mL IVPB premix, PRN  famotidine (PEPCID) tablet 20 mg, Daily  aspirin EC tablet 81 mg, Daily  atorvastatin (LIPITOR) tablet 80 mg, Nightly  bisacodyl (DULCOLAX) suppository 10 mg, Daily PRN  bisacodyl (DULCOLAX) EC tablet 5 mg, Daily PRN  citalopram (CELEXA) tablet 10 mg, Daily  melatonin tablet 6 mg, Nightly PRN  tamsulosin (FLOMAX) capsule 0.4 mg, Daily  HYDROmorphone HCl PF (DILAUDID) injection 0.25 mg, Q3H PRN   Or  HYDROmorphone HCl PF (DILAUDID) injection 0.5 mg, Q3H PRN  oxyCODONE-acetaminophen (PERCOCET) 5-325 MG per tablet 1 tablet, Q4H PRN  insulin lispro (1 Unit Dial) 0-6 Units, TID WC  insulin lispro (1 Unit Dial) 0-3 Units, Nightly  glucose (GLUTOSE) 40 % oral gel 15 g, PRN  dextrose 50 % IV solution, PRN  glucagon (rDNA) injection 1 mg, PRN  dextrose 5 % solution, PRN        Objective:  BP (!) 143/61   Pulse 84   Temp 98 °F (36.7 °C) (Oral)   Resp 16   Ht 5' 11\" (1.803 m)   Wt 159 lb (72.1 kg)   SpO2 98%   BMI 22.18 kg/m²     Intake/Output Summary (Last 24 hours) at 8/4/2021 1313  Last data filed at 8/4/2021 1115  Gross per 24 hour   Intake 456.67 ml   Output 750 ml   Net -293.33 ml      Wt Readings from Last 3 Encounters:   08/01/21 159 lb (72.1 kg)   12/12/19 186 lb 4.6 oz (84.5 kg)       Physical Examination:   General appearance: No apparent distress appears stated age and cooperative. HEENT Normal cephalic, atraumatic without obvious deformity. Pupils equal, round, and reactive to light. Extra ocular muscles intact. Conjunctivae/corneas clear. Lungs: Clear to auscultation, bilaterally without Rales/Wheezes/Rhonchi with good respiratory effort. Heart: Regular rate and rhythm with Normal S1/S2 without murmurs, rubs or gallops, point of maximum impulse non-displaced  Abdomen: Soft, non-tender or non-distended without rigidity or guarding and positive bowel sounds all four quadrants. Extremities: No clubbing, cyanosis, or edema bilaterally. Full range of motion without deformity and normal gait intact. Neurologic: Alert and oriented X 3, neurovascularly intact with sensory/motor intact upper extremities/lower extremities, bilaterally. Cranial nerves: II-XII intact, grossly non-focal.  Psychiatry: Appropriate affect. Not agitated  Skin: Patient has wound to left heel that was present on admission. Wound is 4x 3.5 cm, wound bed covered with dry slough and eschar. Zoraida wound is non blanchable red, dried yellow slough and devitalized tissue.  There is no odor and patient has no report of pain. Will request santyl to wound, cover with saline moistened gauze and foam dressing. Discussed case with nurse Ja Matamoros, float heels off of bed. Patient also has abrasions to buttocks area, and various small abrasions to bilateral legs that are scabbed over. There is one open wound to posterior left leg with pink wound bed, no swelling or drainage noted. Will continue to use apply Triad ointment and  foam dressings to buttocks, left foot  and left leg wounds. Labs and Tests:  CBC:   Recent Labs     08/03/21  0638 08/03/21  1753 08/04/21  0555   WBC 18.5* 16.7* 14.5*   HGB 7.0* 7.9* 7.5*    314 285     BMP:    Recent Labs     08/03/21  0638 08/03/21  1753 08/04/21  0555   * 128* 127*   K 4.2 4.9 4.1   CL 96* 98* 97*   CO2 23 22 23   BUN 21* 19 17   CREATININE 0.8 0.8 0.8   GLUCOSE 87 165* 106*     Hepatic: No results for input(s): AST, ALT, ALB, BILITOT, ALKPHOS in the last 72 hours. XR ABDOMEN (2 VIEWS)   Final Result   Findings suggest early/partial small bowel obstruction without evidence for   fecal impaction and very little formed stool present. FLUORO FOR SURGICAL PROCEDURES   Final Result      XR HIP LEFT (1 VIEW)   Final Result      XR PELVIS (1-2 VIEWS)   Final Result   Persistent superior dislocation of the right hip prosthesis. XR HIP RIGHT (1 VIEW)   Final Result   Dislocated right hip prosthesis. Problem List  Active Problems:    Hip dislocation, right, initial encounter ([address]    Closed dislocation of right hip ([address]  Resolved Problems:    * No resolved hospital problems.  Belen May MD   8/4/2021 1:13 PM

## 2021-08-04 NOTE — CARE COORDINATION
Aware per MD that pt is not dc ready today. Cancelled transport, informed daughter and facility.   Electronically signed by CLEO Gallegos on 8/4/2021 at 1:48 PM

## 2021-08-04 NOTE — CONSULTS
Urology Consult Note  [facility]     Patient: April Lr MRN: [id number]  Room/Bed: [address] [place] [address]/1986-06   YOB: 1929  Age/Sex: 80 y.o.male  Admission Date: 8/1/2021     Date of Service:  8/4/2021    Consulting Provider: SAADIA Parisi - CNP  Admitting/Requesting Physician: Yahaira Mcgraw MD  Primary Care Physician: Taz Castillo    Reason for Consult: Urinary Retention, UTI    ASSESSMENT/PLAN     79 yo male hx of recent hip fracture s/p arthroplasty, CAD, advanced dementia, DM, BPH on flomax. Now s/p closed reduction of right hip this admission. His urologist is Dr. Glenda Cadet with [facility]. He has been catheter dependent for the last x3 months. He had recent cysto/UDS 07/2021. Cysto revealed obstructive lateral prostatic lobes with a narrow bladder neck. UDS results suggest incomplete bladder emptying possibly 2/2 to chronic bladder outlet obstruction from BPH, some detrusor muscle underactively. Dr. Glenda Cadet was planning TURP. His UA is concerning for infection with 65 wbc, yeast is present. Leukocytosis 14.5 today. Cr Stable. Recommendations:  -Exchange 16F coude today  -UA concerning for infection, continue abx, f/u culture, yeast present in urine, consider treating with fluconazole 100mg daily for 4 days. -F/u with Dr. Glenda Cadet with marleny x1 week to make sure urine is sterile prior to TURP  -Would be ok to discharge per  when medically clear  -Please call with any questions    All patient questions were answered. He understands the plan as listed above. HISTORY     Chief Complaint:   Chief Complaint   Patient presents with    Hip Pain     fall 3 weeks ago, R femur fx, sudden onset of R hip pain 1 hour ago, 10/10. History of Present Illness: April Lr is a 80 y.o. male with urinary retention. Onset of symptoms was x3 months ago with the same course since that time. Symptoms are aggravated by BPH. Symptoms improved with bowling.  Associated symptoms include decreased urine output. Patient also reports retention. He has tried the following treatments: bowling. Past Medical History:  He has a past medical history of CAD (coronary artery disease), Cervical disc disease, Cognitive communication deficit, Dementia ([facility] Diabetes mellitus ([facility] Hip fracture ([facility] [address] [facility] Hyperlipidemia, Hypertension, Lack of coordination, Muscle weakness, Other abnormalities of gait and mobility, Spinal stenosis, and Unsteadiness on feet. Hospital Problem List:  Active Problems:    Hip dislocation, right, initial encounter ([facility]    Closed dislocation of right hip ([facility]  Resolved Problems:    * No resolved hospital problems. *      Past Surgical History:  He has a past surgical history that includes Hip Closed Reduction (Right, 8/1/2021). Social History:  He reports that he has never smoked. He has never used smokeless tobacco. He reports previous alcohol use. He reports that he does not use drugs. Family History:  family history is not on file.     Allergies:  No Known Allergies    Medications:  Scheduled Meds:   sodium chloride  1 g Oral TID WC    cefTRIAXone (ROCEPHIN) IV  1,000 mg Intravenous Q24H    oxyCODONE-acetaminophen  1 tablet Oral Once    sodium chloride flush  5-40 mL Intravenous 2 times per day    enoxaparin  40 mg Subcutaneous Daily    famotidine  20 mg Oral Daily    aspirin  81 mg Oral Daily    atorvastatin  80 mg Oral Nightly    citalopram  10 mg Oral Daily    tamsulosin  0.4 mg Oral Daily    insulin lispro  0-6 Units Subcutaneous TID WC    insulin lispro  0-3 Units Subcutaneous Nightly     Continuous Infusions:   sodium chloride      sodium chloride      dextrose       PRN Meds:sodium chloride, Fixodent Complete, sodium chloride flush, sodium chloride, ondansetron **OR** ondansetron, polyethylene glycol, acetaminophen **OR** acetaminophen, potassium chloride **OR** potassium alternative oral replacement **OR** potassium chloride, magnesium sulfate, bisacodyl, bisacodyl, melatonin, HYDROmorphone **OR** HYDROmorphone, oxyCODONE-acetaminophen, glucose, dextrose, glucagon (rDNA), dextrose    Review of Systems:  Pertinent positives/negatives reviewed in HPI. All other systems reviewed and negative, unless noted below. Constitutional: Negative  Genitourinary: see HPI  HEENT: Negative   Cardiovascular: Negative   Respiratory: Negative   Gastrointestinal: Negative   Musculoskeletal: Negative   Neurological: Negative   Psychiatric: Negative   Integumentary: Negative     PHYSICAL EXAM     Vitals:    08/04/21 1115   BP: (!) 143/61   Pulse: 84   Resp: 16   Temp: 98 °F (36.7 °C)   SpO2: 98%     CONSTITUTIONAL: The patient is well nourished/developed, with no distress noted. NEUROLOGICAL/PSYCHIATRIC: Oriented to place and time, normal affected noted. NECK: The neck is symmetrical and supple, with no masses noted. CARDIOVASCULAR: Regular rate and rhythm, no evidence of swelling noted. RESPIRATORY: Normal respiratory effort with no wheezing noted. ABDOMEN: Abdomen soft, non-tender, non-distended. No enlarged liver or spleen. No hernias noted. Stool occult blood not indicated. SKIN: Skin appears normal.  LYMPHATICS: No adenopathy noted. CVA: No CVA tenderness bilaterally. GENITOURINARY: The penis is without rash or lesions and meatus with expected size and location. The scrotum appears normal. Bilateral testicles appears to be of normal size and location. No masses or tenderness noted of testicles or epididymis.      Ins/Outs:    Intake/Output Summary (Last 24 hours) at 8/4/2021 1144  Last data filed at 8/4/2021 1115  Gross per 24 hour   Intake 456.67 ml   Output 750 ml   Net -293.33 ml       LABS     CBC   Lab Results   Component Value Date    WBC 14.5 08/04/2021    RBC 2.42 08/04/2021    HGB 7.5 08/04/2021    HCT 22.2 08/04/2021    MCV 91.9 08/04/2021    MCH 31.0 08/04/2021    MCHC 33.7 08/04/2021    RDW 16.7 08/04/2021     08/04/2021    MPV 6.9 08/04/2021     BMP   Lab Results   Component Value Date     08/04/2021    K 4.1 08/04/2021    CL 97 08/04/2021    CO2 23 08/04/2021    BUN 17 08/04/2021    CREATININE 0.8 08/04/2021    GLUCOSE 106 08/04/2021    CALCIUM 7.7 08/04/2021     Urinalysis:   Lab Results   Component Value Date    COLORU DK YELLOW 08/03/2021    GLUCOSEU Negative 08/03/2021    BLOODU Negative 08/03/2021    NITRU Negative 08/03/2021    LEUKOCYTESUR LARGE 08/03/2021     Urine culture: No results for input(s): Jv Johnson in the last 72 hours. PSA: No results found for: PSA      IMAGING     XR PELVIS (1-2 VIEWS)    Result Date: 8/1/2021  EXAMINATION: ONE XRAY VIEW OF THE PELVIS 8/1/2021 6:26 am COMPARISON: 08/01/2021 HISTORY: ORDERING SYSTEM PROVIDED HISTORY: post-reduction TECHNOLOGIST PROVIDED HISTORY: Reason for exam:->post-reduction FINDINGS: There is persistent superior dislocation of the right hip prosthesis. No definite fracture is seen though the right greater trochanter is not included. Partially imaged is lower lumbar spondylosis. Bladder catheter is in place. Persistent superior dislocation of the right hip prosthesis. XR HIP LEFT (1 VIEW)    Result Date: 8/1/2021  Radiology exam is complete. No Radiologist dictation. Please follow up with ordering provider. XR HIP RIGHT (1 VIEW)    Result Date: 8/1/2021  EXAMINATION: ONE XRAY VIEW OF THE RIGHT HIP 8/1/2021 3:53 am COMPARISON: None. HISTORY: ORDERING SYSTEM PROVIDED HISTORY: injury TECHNOLOGIST PROVIDED HISTORY: Reason for exam:->injury FINDINGS: A single nonstandard view was obtained in this patient in severe pain. A right hip arthroplasty device is seen with superior dislocation of the femoral component. Dislocated right hip prosthesis. FLUORO FOR SURGICAL PROCEDURES    Result Date: 8/1/2021  Radiology exam is complete. No Radiologist dictation. Please follow up with ordering provider.      XR ABDOMEN (2 VIEWS)    Result Date: 8/3/2021  EXAMINATION: TWO

## 2021-08-04 NOTE — CONSULTS
Office : [phone]     Fax :[phone]       Nephrology Consult Note      Patient's Name: April Lr  7:33 AM  8/4/2021    Reason for Consult:  Hyponatremia. Requesting Physician:  Taz Castillo      Chief Complaint:    Chief Complaint   Patient presents with    Hip Pain     fall 3 weeks ago, R femur fx, sudden onset of R hip pain 1 hour ago, 10/10. History of Present Ilness:    April Lr is a 80 y.o. male with h/o dementia, recent hip fracture, status post hip arthroplasty, history of CAD, diabetes mellitus, BPH presents from nursing facility in view of hip dislocation. Patient status post OR for closed reduction of right hip with orthopedic surgery today. Medicine consulted for admission. Patient does not provide good history in view of dementia. Patient's daughter Wanda Charlton provides history as above. Patient currently states he is comfortable and denies pain. Has low sodium levels of 126  Has chronically low sodium levels     C/o mild headache  Able to respond appropriately to simple commands   No SOB   Daughter at bedside who gave most of his history   He lives at [facility]          I/O last 3 completed shifts:   In: 336.7 [Blood:336.7]  Out: 750 [Urine:750]    Past Medical History:   Diagnosis Date    CAD (coronary artery disease)     Cervical disc disease     Cognitive communication deficit     Dementia ([address]     Diabetes mellitus ([address]     Hip fracture ([address]     Hyperlipidemia     Hypertension     Lack of coordination     Muscle weakness     Other abnormalities of gait and mobility     Spinal stenosis     Unsteadiness on feet        Past Surgical History:   Procedure Laterality Date    HIP CLOSED REDUCTION Right 8/1/2021    HIP CLOSED REDUCTION performed by Harry Glover MD at [address] [place] [address]       History reviewed. No pertinent family history. reports that he has never smoked. He has never used smokeless tobacco. He reports previous alcohol use. He reports that he does not use drugs. Allergies:  Patient has no known allergies.     Current Medications:    0.9 % sodium chloride infusion, PRN  cefTRIAXone (ROCEPHIN) 1000 mg in sterile water 10 mL IV syringe, Q24H  Fixodent Complete CREA 1 applicator, Daily PRN  oxyCODONE-acetaminophen (PERCOCET) 5-325 MG per tablet 1 tablet, Once  sodium chloride flush 0.9 % injection 5-40 mL, 2 times per day  sodium chloride flush 0.9 % injection 5-40 mL, PRN  0.9 % sodium chloride infusion, PRN  enoxaparin (LOVENOX) injection 40 mg, Daily  ondansetron (ZOFRAN-ODT) disintegrating tablet 4 mg, Q8H PRN   Or  ondansetron (ZOFRAN) injection 4 mg, Q6H PRN  polyethylene glycol (GLYCOLAX) packet 17 g, Daily PRN  acetaminophen (TYLENOL) tablet 650 mg, Q6H PRN   Or  acetaminophen (TYLENOL) suppository 650 mg, Q6H PRN  potassium chloride (KLOR-CON M) extended release tablet 40 mEq, PRN   Or  potassium bicarb-citric acid (EFFER-K) effervescent tablet 40 mEq, PRN   Or  potassium chloride 10 mEq/100 mL IVPB (Peripheral Line), PRN  magnesium sulfate 2000 mg in 50 mL IVPB premix, PRN  famotidine (PEPCID) tablet 20 mg, Daily  aspirin EC tablet 81 mg, Daily  atorvastatin (LIPITOR) tablet 80 mg, Nightly  bisacodyl (DULCOLAX) suppository 10 mg, Daily PRN  bisacodyl (DULCOLAX) EC tablet 5 mg, Daily PRN  citalopram (CELEXA) tablet 10 mg, Daily  melatonin tablet 6 mg, Nightly PRN  tamsulosin (FLOMAX) capsule 0.4 mg, Daily  HYDROmorphone HCl PF (DILAUDID) injection 0.25 mg, Q3H PRN   Or  HYDROmorphone HCl PF (DILAUDID) injection 0.5 mg, Q3H PRN  oxyCODONE-acetaminophen (PERCOCET) 5-325 MG per tablet 1 tablet, Q4H PRN  insulin lispro (1 Unit Dial) 0-6 Units, TID WC  insulin lispro (1 Unit Dial) 0-3 Units, Nightly  glucose (GLUTOSE) 40 % oral gel 15 g, PRN  dextrose 50 % IV solution, PRN  glucagon (rDNA) injection 1 mg, PRN  dextrose 5 % solution, PRN        Review of Systems:   14 point ROS obtained but were negative except mentioned in HPI      Physical exam:     Vitals:  BP (!) 145/63   Pulse 84   Temp 97.1 °F (36.2 °C) (Oral)   Resp 18   Ht 5' 11\" (1.803 m)   Wt 159 lb (72.1 kg)   SpO2 98%   BMI 22.18 kg/m²   Constitutional: awake and alert   Skin: no rash, turgor wnl  Heent:  eomi, mmm  Neck: no bruits or jvd noted  Cardiovascular:  S1, S2 without m/r/g  Respiratory: CTA B without w/r/r  Abdomen:  +bs, soft, nt, nd  Ext: no  lower extremity edema  Psychiatric: mood and affect appropriate  Musculoskeletal:  Rom, muscular strength intact    Labs:  CBC:   Recent Labs     08/03/21  0638 08/03/21  1753 08/04/21  0555   WBC 18.5* 16.7* 14.5*   HGB 7.0* 7.9* 7.5*    314 285     BMP:    Recent Labs     08/03/21  0638 08/03/21  1753 08/04/21  0555   * 128* 127*   K 4.2 4.9 4.1   CL 96* 98* 97*   CO2 23 22 23   BUN 21* 19 17   CREATININE 0.8 0.8 0.8   GLUCOSE 87 165* 106*     Ca/Mg/Phos:   Recent Labs     08/02/21  0533 08/02/21  0533 08/03/21  8004 08/03/21  1753 08/04/21  0555   CALCIUM 8.0*   < > 8.0* 7.8* 7.7*   MG 1.90  --  1.90  --  1.90   PHOS 3.6  --  3.3  --  2.8    < > = values in this interval not displayed. Hepatic: No results for input(s): AST, ALT, ALB, BILITOT, ALKPHOS in the last 72 hours. Troponin: No results for input(s): TROPONINI in the last 72 hours. BNP: No results for input(s): BNP in the last 72 hours. Lipids: No results for input(s): CHOL, TRIG, HDL, LDLCALC, LABVLDL in the last 72 hours. ABGs: No results for input(s): PHART, PO2ART, GRT2ABK in the last 72 hours. INR: No results for input(s): INR in the last 72 hours.   UA:  Recent Labs     08/03/21  1202   COLORU DK YELLOW   CLARITYU CLOUDY*   GLUCOSEU Negative   BILIRUBINUR MODERATE*   KETUA 15*   SPECGRAV 1.023   BLOODU Negative   PHUR 5.5   PROTEINU TRACE* UROBILINOGEN 1.0   NITRU Negative   LEUKOCYTESUR LARGE*   LABMICR YES   Samaria Folks NotGiven      Urine Microscopic:   Recent Labs     08/03/21  1202   COMU see below   HYALCAST 5   WBCUA 65*   RBCUA 0-2   EPIU 3     Urine Culture: No results for input(s): Jimena Almanzar in the last 72 hours. Urine Chemistry: No results for input(s): Charlynne Figueroa, PROTEINUR, NAUR in the last 72 hours. IMAGING:  XR ABDOMEN (2 VIEWS)   Final Result   Findings suggest early/partial small bowel obstruction without evidence for   fecal impaction and very little formed stool present. FLUORO FOR SURGICAL PROCEDURES   Final Result      XR HIP LEFT (1 VIEW)   Final Result      XR PELVIS (1-2 VIEWS)   Final Result   Persistent superior dislocation of the right hip prosthesis. XR HIP RIGHT (1 VIEW)   Final Result   Dislocated right hip prosthesis. Assessment/Plan :      1. 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 We will start sodium chloride tablet 1 g p.o. 3 times daily. 2.  Dementia. Stable    3. Recent fall and hip dislocation. Ortho on board    4. Anemia we will check iron studies. Monitor hemoglobin level.   Transfuse if hemoglobin less than 7    D/w primary team      Thank you for allowing us to participate in care of Oscar Harry         Electronically signed by: Rustam Flores MD, 8/4/2021, 7:33 AM      [facility] [address]  Office : [phone]  Fax :[phone]

## 2021-08-04 NOTE — CARE COORDINATION
DISCHARGE SUMMARY     DATE OF DISCHARGE: 08/04/21    DISCHARGE DESTINATION: [address] [place] [address]    Level of Care: Long Term    Report Number: [phone]    Fax Number:  [phone]    Precert Obtained: N/A    Hens Completed: yes    PASARR: N/A    Notified: RN, Family and Facility/Agency-facility and daughter aware of transport time    Prescriptions Faxed:N/A    [facility] Name: [address] [place] [address] up Time: 5PM    Phone Number: [phone]    Electronically signed by CLEO Baird on 8/4/2021 at 12:20 PM

## 2021-08-04 NOTE — PROGRESS NOTES
HOSPITALISTS PROGRESS NOTE    8/4/2021 3:34 PM        Name: Isela Graham . Admitted: 8/1/2021  Primary Care Provider: Timoteo Jackson (Tel: [phone])      CC: Hip dislocation    Brief Course: This 80 y. o. male with past medical history of recent hip fracture, status post hip arthroplasty, history of CAD, advanced dementia, diabetes mellitus, BPH presents from nursing facility in view of hip dislocation. Patient status post OR for closed reduction of right hip with orthopedic surgery on 8/2/20121. Medicine consulted for admission. Patient does not provide good history in view of dementia    Interval history:   Patient seen and examined today  Overnight events and interval ancillary notes removed  S/p 1 unit of packed RBC; Hgb seven-point this morning  Iron studies ordered  Started on sodium tabs per nephrology      Assessment & Plan:   Close dislocation of right hip  Status post reduction  Pain management per orthopedic surgery     Leukocytosis; likely secondary to UTI; trending down  Blood and urine culture ordered  Patient has a chronic Patel's  Urine analysis suggestive of UTI; started empiric ceftriaxone awaiting culture    UTI: Urine suggestive of infection  Started on ceftriaxone and Diflucan for 4 days  Patel exchanged; repeat urine analysis order    Hx of BPH catheter dependent on the last 3-month  Follows with urology status post cystoscopy/UDS on 7/2021 which relieved obstructive lateral prostatic lobes with a narrow bladder neck.  UDS results suggest incomplete bladder emptying possibly 2/2 to chronic bladder outlet obstruction from BPH, some detrusor muscle underactively; plan for TURP by Dr. David Smith        Hyponatremia: Chronic  Follow-up sodium level morning 8/3/2021 prior to discharge  Patient provided with prescription from [facility] to be performed 8/5/2021  Free water restriction 1800 cc daily  Nephrology on board; started on sodium tabs     CAD  Asymptomatic  Continue aspirin and statin     BPH  Continue Flomax     Diabetes mellitus  Not currently on any antihyperglycemic's  Carb controlled diet  POCT AC at bedtime  Sliding scale insulin    DVT PPX: Lovenox  Code:[id number]-CCA  Diet: ADULT DIET;  Regular; 5 carb choices (75 gm/meal); 1800 ml  Adult Oral Nutrition Supplement; Diabetic Oral Supplement    Disposition: Discharge home tomorrow if medically stable    Current Medications  sodium chloride tablet 1 g, TID WC  fluconazole (DIFLUCAN) tablet 100 mg, Daily  0.9 % sodium chloride infusion, PRN  cefTRIAXone (ROCEPHIN) 1000 mg in sterile water 10 mL IV syringe, Q24H  Fixodent Complete CREA 1 applicator, Daily PRN  oxyCODONE-acetaminophen (PERCOCET) 5-325 MG per tablet 1 tablet, Once  sodium chloride flush 0.9 % injection 5-40 mL, 2 times per day  sodium chloride flush 0.9 % injection 5-40 mL, PRN  0.9 % sodium chloride infusion, PRN  enoxaparin (LOVENOX) injection 40 mg, Daily  ondansetron (ZOFRAN-ODT) disintegrating tablet 4 mg, Q8H PRN   Or  ondansetron (ZOFRAN) injection 4 mg, Q6H PRN  polyethylene glycol (GLYCOLAX) packet 17 g, Daily PRN  acetaminophen (TYLENOL) tablet 650 mg, Q6H PRN   Or  acetaminophen (TYLENOL) suppository 650 mg, Q6H PRN  potassium chloride (KLOR-CON M) extended release tablet 40 mEq, PRN   Or  potassium bicarb-citric acid (EFFER-K) effervescent tablet 40 mEq, PRN   Or  potassium chloride 10 mEq/100 mL IVPB (Peripheral Line), PRN  magnesium sulfate 2000 mg in 50 mL IVPB premix, PRN  famotidine (PEPCID) tablet 20 mg, Daily  aspirin EC tablet 81 mg, Daily  atorvastatin (LIPITOR) tablet 80 mg, Nightly  bisacodyl (DULCOLAX) suppository 10 mg, Daily PRN  bisacodyl (DULCOLAX) EC tablet 5 mg, Daily PRN  citalopram (CELEXA) tablet 10 mg, Daily  melatonin tablet 6 mg, Nightly PRN  tamsulosin (FLOMAX) capsule 0.4 mg, Daily  HYDROmorphone HCl PF (DILAUDID) injection 0.25 mg, Q3H PRN   Or  HYDROmorphone HCl PF (DILAUDID) injection 0.5 mg, Q3H PRN  oxyCODONE-acetaminophen (PERCOCET) 5-325 MG per tablet 1 tablet, Q4H PRN  insulin lispro (1 Unit Dial) 0-6 Units, TID WC  insulin lispro (1 Unit Dial) 0-3 Units, Nightly  glucose (GLUTOSE) 40 % oral gel 15 g, PRN  dextrose 50 % IV solution, PRN  glucagon (rDNA) injection 1 mg, PRN  dextrose 5 % solution, PRN        Objective:  BP (!) 143/61   Pulse 84   Temp 98 °F (36.7 °C) (Oral)   Resp 16   Ht 5' 11\" (1.803 m)   Wt 159 lb (72.1 kg)   SpO2 98%   BMI 22.18 kg/m²     Intake/Output Summary (Last 24 hours) at 8/4/2021 1534  Last data filed at 8/4/2021 1115  Gross per 24 hour   Intake 456.67 ml   Output 550 ml   Net -93.33 ml      Wt Readings from Last 3 Encounters:   08/01/21 159 lb (72.1 kg)   12/12/19 186 lb 4.6 oz (84.5 kg)       Physical Examination:   General appearance: No apparent distress appears stated age and cooperative. HEENT Normal cephalic, atraumatic without obvious deformity. Pupils equal, round, and reactive to light. Extra ocular muscles intact. Conjunctivae/corneas clear. Lungs: Clear to auscultation, bilaterally without Rales/Wheezes/Rhonchi with good respiratory effort. Heart: Regular rate and rhythm with Normal S1/S2 without murmurs, rubs or gallops, point of maximum impulse non-displaced  Abdomen: Soft, non-tender or non-distended without rigidity or guarding and positive bowel sounds all four quadrants. Extremities: No clubbing, cyanosis, or edema bilaterally. Full range of motion without deformity and normal gait intact. Neurologic: Alert and oriented X 3, neurovascularly intact with sensory/motor intact upper extremities/lower extremities, bilaterally. Cranial nerves: II-XII intact, grossly non-focal.  Psychiatry: Appropriate affect. Not agitated  Skin:  Patient has wound to left heel that was present on admission. Wound is 4x 3.5 cm, wound bed covered with dry slough and eschar.  Zoraida wound is non blanchable red, dried yellow slough and devitalized tissue. There is no odor and patient has no report of pain. Will request santyl to wound, cover with saline moistened gauze and foam dressing. Discussed case with nurse Abbie Gaitan, float heels off of bed. Patient also has abrasions to buttocks area, and various small abrasions to bilateral legs that are scabbed over. There is one open wound to posterior left leg with pink wound bed, no swelling or drainage noted. Will continue to use apply Triad ointment and  foam dressings to buttocks, left foot  and left leg wounds. Labs and Tests:  CBC:   Recent Labs     08/03/21  0638 08/03/21  1753 08/04/21  0555   WBC 18.5* 16.7* 14.5*   HGB 7.0* 7.9* 7.5*    314 285     BMP:    Recent Labs     08/03/21  0638 08/03/21  1753 08/04/21  0555   * 128* 127*   K 4.2 4.9 4.1   CL 96* 98* 97*   CO2 23 22 23   BUN 21* 19 17   CREATININE 0.8 0.8 0.8   GLUCOSE 87 165* 106*     Hepatic: No results for input(s): AST, ALT, ALB, BILITOT, ALKPHOS in the last 72 hours. XR ABDOMEN (2 VIEWS)   Final Result   Findings suggest early/partial small bowel obstruction without evidence for   fecal impaction and very little formed stool present. FLUORO FOR SURGICAL PROCEDURES   Final Result      XR HIP LEFT (1 VIEW)   Final Result      XR PELVIS (1-2 VIEWS)   Final Result   Persistent superior dislocation of the right hip prosthesis. XR HIP RIGHT (1 VIEW)   Final Result   Dislocated right hip prosthesis. Problem List  Active Problems:    Hip dislocation, right, initial encounter ([facility]    Closed dislocation of right hip ([address]  Resolved Problems:    * No resolved hospital problems.  Artur Jimenez MD   8/4/2021 3:34 PM

## 2021-08-04 NOTE — PROGRESS NOTES
Per Joseph Peck NP, replace chronic bowling with 18fr coude catheter, New 18 Serbian coude catheter placed without difficulty, urine return noted. And clean specimen sent to lab.   Daniel Hopkins RN

## 2021-08-05 LAB
ANION GAP SERPL CALCULATED.3IONS-SCNC: 7 MMOL/L (ref 3–16)
BASOPHILS ABSOLUTE: 0.1 K/UL (ref 0–0.2)
BASOPHILS RELATIVE PERCENT: 0.4 %
BLOOD CULTURE, ROUTINE: NORMAL
BUN BLDV-MCNC: 16 MG/DL (ref 7–20)
CALCIUM SERPL-MCNC: 7.7 MG/DL (ref 8.3–10.6)
CHLORIDE BLD-SCNC: 100 MMOL/L (ref 99–110)
CO2: 23 MMOL/L (ref 21–32)
CREAT SERPL-MCNC: 0.7 MG/DL (ref 0.8–1.3)
CULTURE, BLOOD 2: NORMAL
EOSINOPHILS ABSOLUTE: 0.4 K/UL (ref 0–0.6)
EOSINOPHILS RELATIVE PERCENT: 3.5 %
GFR AFRICAN AMERICAN: >60
GFR NON-AFRICAN AMERICAN: >60
GLUCOSE BLD-MCNC: 101 MG/DL (ref 70–99)
GLUCOSE BLD-MCNC: 102 MG/DL (ref 70–99)
GLUCOSE BLD-MCNC: 121 MG/DL (ref 70–99)
GLUCOSE BLD-MCNC: 139 MG/DL (ref 70–99)
GLUCOSE BLD-MCNC: 179 MG/DL (ref 70–99)
HCT VFR BLD CALC: 22 % (ref 40.5–52.5)
HEMOGLOBIN: 7.2 G/DL (ref 13.5–17.5)
LYMPHOCYTES ABSOLUTE: 1 K/UL (ref 1–5.1)
LYMPHOCYTES RELATIVE PERCENT: 8.2 %
MAGNESIUM: 1.9 MG/DL (ref 1.8–2.4)
MCH RBC QN AUTO: 30.7 PG (ref 26–34)
MCHC RBC AUTO-ENTMCNC: 33 G/DL (ref 31–36)
MCV RBC AUTO: 93.2 FL (ref 80–100)
MONOCYTES ABSOLUTE: 1.1 K/UL (ref 0–1.3)
MONOCYTES RELATIVE PERCENT: 9.7 %
NEUTROPHILS ABSOLUTE: 9.1 K/UL (ref 1.7–7.7)
NEUTROPHILS RELATIVE PERCENT: 78.2 %
ORGANISM: ABNORMAL
ORGANISM: ABNORMAL
PDW BLD-RTO: 16.4 % (ref 12.4–15.4)
PERFORMED ON: ABNORMAL
PHOSPHORUS: 3.2 MG/DL (ref 2.5–4.9)
PLATELET # BLD: 276 K/UL (ref 135–450)
PLATELET SLIDE REVIEW: ADEQUATE
PMV BLD AUTO: 7.1 FL (ref 5–10.5)
POTASSIUM SERPL-SCNC: 4.4 MMOL/L (ref 3.5–5.1)
RBC # BLD: 2.36 M/UL (ref 4.2–5.9)
SLIDE REVIEW: ABNORMAL
SODIUM BLD-SCNC: 130 MMOL/L (ref 136–145)
URINE CULTURE, ROUTINE: ABNORMAL
URINE CULTURE, ROUTINE: ABNORMAL
URINE CULTURE, ROUTINE: NORMAL
WBC # BLD: 11.6 K/UL (ref 4–11)

## 2021-08-05 PROCEDURE — 1200000000 HC SEMI PRIVATE

## 2021-08-05 PROCEDURE — 2580000003 HC RX 258: Performed by: INTERNAL MEDICINE

## 2021-08-05 PROCEDURE — 84100 ASSAY OF PHOSPHORUS: CPT

## 2021-08-05 PROCEDURE — 97530 THERAPEUTIC ACTIVITIES: CPT

## 2021-08-05 PROCEDURE — 99233 SBSQ HOSP IP/OBS HIGH 50: CPT | Performed by: INTERNAL MEDICINE

## 2021-08-05 PROCEDURE — 6360000002 HC RX W HCPCS: Performed by: INTERNAL MEDICINE

## 2021-08-05 PROCEDURE — 6370000000 HC RX 637 (ALT 250 FOR IP): Performed by: INTERNAL MEDICINE

## 2021-08-05 PROCEDURE — 36415 COLL VENOUS BLD VENIPUNCTURE: CPT

## 2021-08-05 PROCEDURE — 83735 ASSAY OF MAGNESIUM: CPT

## 2021-08-05 PROCEDURE — 80048 BASIC METABOLIC PNL TOTAL CA: CPT

## 2021-08-05 PROCEDURE — 85025 COMPLETE CBC W/AUTO DIFF WBC: CPT

## 2021-08-05 RX ORDER — AMOXICILLIN 250 MG/1
500 CAPSULE ORAL EVERY 8 HOURS SCHEDULED
Status: DISCONTINUED | OUTPATIENT
Start: 2021-08-06 | End: 2021-08-06 | Stop reason: HOSPADM

## 2021-08-05 RX ORDER — AMLODIPINE BESYLATE 5 MG/1
2.5 TABLET ORAL DAILY
Status: DISCONTINUED | OUTPATIENT
Start: 2021-08-05 | End: 2021-08-06

## 2021-08-05 RX ADMIN — ASPIRIN 81 MG: 81 TABLET, COATED ORAL at 09:30

## 2021-08-05 RX ADMIN — CITALOPRAM HYDROBROMIDE 10 MG: 20 TABLET ORAL at 09:30

## 2021-08-05 RX ADMIN — IRON SUCROSE 200 MG: 20 INJECTION, SOLUTION INTRAVENOUS at 12:59

## 2021-08-05 RX ADMIN — ENOXAPARIN SODIUM 40 MG: 40 INJECTION SUBCUTANEOUS at 09:30

## 2021-08-05 RX ADMIN — Medication 10 ML: at 09:31

## 2021-08-05 RX ADMIN — AMLODIPINE BESYLATE 2.5 MG: 5 TABLET ORAL at 14:33

## 2021-08-05 RX ADMIN — SODIUM CHLORIDE TAB 1 GM 1 G: 1 TAB at 11:47

## 2021-08-05 RX ADMIN — TAMSULOSIN HYDROCHLORIDE 0.4 MG: 0.4 CAPSULE ORAL at 09:30

## 2021-08-05 RX ADMIN — FAMOTIDINE 20 MG: 20 TABLET ORAL at 09:30

## 2021-08-05 RX ADMIN — INSULIN LISPRO 1 UNITS: 100 INJECTION, SOLUTION INTRAVENOUS; SUBCUTANEOUS at 21:56

## 2021-08-05 RX ADMIN — ACETAMINOPHEN 650 MG: 325 TABLET ORAL at 09:39

## 2021-08-05 RX ADMIN — ACETAMINOPHEN 650 MG: 325 TABLET ORAL at 17:55

## 2021-08-05 RX ADMIN — SODIUM CHLORIDE TAB 1 GM 1 G: 1 TAB at 16:53

## 2021-08-05 RX ADMIN — Medication 10 ML: at 05:16

## 2021-08-05 RX ADMIN — SODIUM CHLORIDE TAB 1 GM 1 G: 1 TAB at 09:38

## 2021-08-05 RX ADMIN — FLUCONAZOLE 100 MG: 100 TABLET ORAL at 09:30

## 2021-08-05 RX ADMIN — OXYCODONE HYDROCHLORIDE AND ACETAMINOPHEN 1 TABLET: 5; 325 TABLET ORAL at 03:51

## 2021-08-05 RX ADMIN — Medication 1000 MG: at 17:50

## 2021-08-05 RX ADMIN — ATORVASTATIN CALCIUM 80 MG: 40 TABLET, FILM COATED ORAL at 21:55

## 2021-08-05 ASSESSMENT — PAIN DESCRIPTION - PAIN TYPE
TYPE: ACUTE PAIN

## 2021-08-05 ASSESSMENT — PAIN SCALES - GENERAL
PAINLEVEL_OUTOF10: 4
PAINLEVEL_OUTOF10: 8
PAINLEVEL_OUTOF10: 6
PAINLEVEL_OUTOF10: 8
PAINLEVEL_OUTOF10: 6
PAINLEVEL_OUTOF10: 6
PAINLEVEL_OUTOF10: 0

## 2021-08-05 ASSESSMENT — PAIN DESCRIPTION - ORIENTATION: ORIENTATION: POSTERIOR

## 2021-08-05 ASSESSMENT — PAIN DESCRIPTION - LOCATION
LOCATION: NECK
LOCATION: HEAD
LOCATION: HEAD

## 2021-08-05 NOTE — PROGRESS NOTES
Shift assessment completed, A&O x1. Evening meds given per MAR. Bed in lowest position, call light and table within reach, x2 side rails, no slip socks on and fall sign posted. The care plan and education has been reviewed and mutually agreed upon with the patient.

## 2021-08-05 NOTE — PLAN OF CARE
Problem: Falls - Risk of:  Goal: Will remain free from falls  Description: Will remain free from falls  Outcome: Ongoing     Problem: Falls - Risk of:  Goal: Will remain free from falls  Description: Will remain free from falls  Outcome: Ongoing  Goal: Absence of physical injury  Description: Absence of physical injury  Outcome: Ongoing     Problem: Pain:  Goal: Pain level will decrease  Description: Pain level will decrease  Outcome: Ongoing  Goal: Control of acute pain  Description: Control of acute pain  Outcome: Ongoing  Goal: Control of chronic pain  Description: Control of chronic pain  Outcome: Ongoing     Problem: Skin Integrity:  Goal: Will show no infection signs and symptoms  Description: Will show no infection signs and symptoms  Outcome: Ongoing  Goal: Absence of new skin breakdown  Description: Absence of new skin breakdown  Outcome: Ongoing     Problem: Nutrition  Goal: Optimal nutrition therapy  Outcome: Ongoing

## 2021-08-05 NOTE — PROGRESS NOTES
Occupational Therapy  Facility/Department: [address] ORTHO/NEURO NURSING  Daily Treatment Note  NAME: Isela Graham  : 3/18/1929  MRN: [id number]    Date of Service: 2021    Discharge Recommendations: Isela Graham scored a  on the AM-PAC ADL Inpatient form. Current research shows that an AM-PAC score of 17 or less is typically not associated with a discharge to the patient's home setting. Based on the patient's AM-PAC score and their current ADL deficits, it is recommended that the patient have 3-5 sessions per week of Occupational Therapy at d/c to increase the patient's independence. Please see assessment section for further patient specific details. If patient discharges prior to next session this note will serve as a discharge summary. Please see below for the latest assessment towards goals. OT Equipment Recommendations  Equipment Needed: No    Assessment   Performance deficits / Impairments: Decreased functional mobility ; Decreased ADL status; Decreased strength;Decreased safe awareness;Decreased cognition;Decreased endurance;Decreased balance;Decreased high-level IADLs  Assessment: As stated above, deficits have impacted pt's occupational performance and is functioning below baseline. Pt continues to demonstrate decreased functional mobility, transfers and ADLs continued skilled OT services recommended to address deficits and improve overall functioning  Treatment Diagnosis: Decreased ADLs, IADLs, transfers, mobility associated with Closed Dislocation of R hip, s/p reduction  Prognosis: Fair  OT Education: OT Role;Plan of Care;Precautions  Patient Education: Educated pt on OT role, plan of care and hip precautions.  Continued education needed for follow through  Barriers to Learning: cognition  REQUIRES OT FOLLOW UP: Yes  Activity Tolerance  Activity Tolerance: Patient limited by fatigue;Patient limited by pain;Treatment limited secondary to decreased cognition  Safety Devices  Safety Devices in place: Yes  Type of devices: All fall risk precautions in place; Left in chair;Call light within reach;Nurse notified; Chair alarm in place;Gait belt;Patient at risk for falls (high fall risk)         Patient Diagnosis(es): The primary encounter diagnosis was Dislocation of hip joint prosthesis, initial encounter ([facility] [address] A diagnosis of Hyponatremia was also pertinent to this visit. has a past medical history of CAD (coronary artery disease), Cervical disc disease, Cognitive communication deficit, Dementia ([facility] [address] Diabetes mellitus ([facility] [address] Hip fracture ([facility] [address] Hyperlipidemia, Hypertension, Lack of coordination, Muscle weakness, Other abnormalities of gait and mobility, Spinal stenosis, and Unsteadiness on feet.   has a past surgical history that includes Hip Closed Reduction (Right, 8/1/2021). Restrictions  Restrictions/Precautions  Restrictions/Precautions: Fall Risk, ROM Restrictions  Required Braces or Orthoses?: Yes (hip abduction pillow placed between legs)  Required Braces or Orthoses  Right Lower Extremity Brace:  (hip abductor pillow)  Position Activity Restriction  Hip Precautions: No hip flexion > 90 degrees, No hip internal rotation, No ADduction, No ABduction  Other position/activity restrictions: Zack Barrios is a 80 y.o. male who presented to [facility] ED on 08/01 from a nursing facility after sustaining a right prosthetic hip dislocation. He sustained a right femoral neck fracture in early June 2021 and underwent anterior left total hip arthroplasty with Dr Angelita Lee at [facility] at that time. He has been doing well but has had issues with his bowels lately. His daughter believes that he dislocated his hip when getting above follow-up. He stays in a nursing facility and has dementia. He denies any other issues at this time.   Subjective   General  Chart Reviewed: Yes  Patient assessed for rehabilitation services?: Yes  Family / Caregiver Present: No  Diagnosis: Closed dislocation of R hip, s/p reduction  Subjective  Subjective: Pt lying supine in bed upon arrival, agreeable to therapy and reported 8/10 pain in head. RN aware. abductor pillow  Pain Assessment  Pain Level: 8  Vital Signs  Patient Currently in Pain: Yes   Orientation  Orientation  Orientation Level: Oriented to person;Disoriented to place; Disoriented to time;Disoriented to situation  Objective    ADL  Feeding: Setup;Stand by assistance (pt required assist to open beverage)  UE Dressing: Minimal assistance (don/doff gown)  Additional Comments: unable to complete further ADLs due to pain and fatigue        Balance  Sitting Balance: Contact guard assistance (CGA to prevent forward flexion)  Standing Balance: Dependent/Total (modA x2, initially with a posterior lean)  Standing Balance  Time: ~60 seconds  Activity: sit<>stand transfers, ambulated ~5 steps  Comment: pt required constant cues to maintain hip precautions  Functional Mobility  Functional - Mobility Device: Rolling Walker  Activity: Other  Assist Level: Dependent/Total (modA x2)  Bed mobility  Supine to Sit: Dependent/Total (maxA x2)  Scooting: Dependent/Total (maxA x2)  Comment: max cues to maintain hip precautions (no hip flexion>90 degrees, no internal rotation, no abduction  Transfers  Sit to stand: Dependent/Total (maxA x2)  Stand to sit: Dependent/Total (maxA x2)  Transfer Comments: max cues to maintain hip precautions                       Cognition  Overall Cognitive Status: Exceptions  Arousal/Alertness: Delayed responses to stimuli  Following Commands: Follows one step commands with increased time; Follows one step commands with repetition  Attention Span: Difficulty dividing attention; Difficulty attending to directions  Memory: Decreased recall of precautions;Decreased recall of recent events;Decreased short term memory  Safety Judgement: Decreased awareness of need for safety;Decreased awareness of need for assistance  Problem Solving: Decreased awareness of errors  Insights: Decreased awareness of deficits  Initiation: Requires cues for some  Sequencing: Requires cues for some              Plan   Plan  Times per week: 5-7  Times per day: Daily  Current Treatment Recommendations: Strengthening, Balance Training, Functional Mobility Training, Endurance Training, Safety Education & Training, Self-Care / ADL, Cognitive Reorientation, Equipment Evaluation, Education, & procurement, Patient/Caregiver Education & Training, Home Management Training  AM-PAC Score        AM-PAC Inpatient Daily Activity Raw Score: 12 (08/05/21 1445)  AM-PAC Inpatient ADL T-Scale Score : 30.6 (08/05/21 1445)  ADL Inpatient CMS 0-100% Score: 66.57 (08/05/21 1445)  ADL Inpatient CMS G-Code Modifier : CL (08/05/21 1445)    Goals  Short term goals  Time Frame for Short term goals: Discharge  Short term goal 1: Bed mobility modA---- ongoing 08/05  Short term goal 2: Functional ADL transfers Leesa Oz---- ongoing 08/05  Short term goal 3: Functional mobility with appropriate AD min to Leesa Oz---- ongoing 08/05  Short term goal 4: Simple UB ADLs setup---- ongoing 08/05  Short term goal 5: Stance x 3 minutes Roshni to assist with ADLs---- ongoing 08/05  Long term goals  Time Frame for Long term goals : LTG=STG  Patient Goals   Patient goals : Not stated       Therapy Time   Individual Concurrent Group Co-treatment   Time In       1355   Time Out       1428   Minutes       33        Total Treatment Minutes:  [id number] [facility],      Occupational Therapist present and directed patient care, made skilled clinical decisions and was responsible for assessment and treatment this date.   Bhavana Esparza OTR/L [id number]

## 2021-08-05 NOTE — PROGRESS NOTES
Physical Therapy  Facility/Department: [address] ORTHO/NEURO NURSING  Daily Treatment Note  NAME: Oscar Harry  : 3/18/1929  MRN: [id number]    Date of Service: 2021    Discharge Recommendations:  Oscar Harry scored a  on the AM-PAC short mobility form. Current research shows that an AM-PAC score of 17 or less is typically not associated with a discharge to the patient's home setting. Based on the patient's AM-PAC score and their current functional mobility deficits, it is recommended that the patient have 3-5 sessions per week of Physical Therapy at d/c to increase the patient's independence. Please see assessment section for further patient specific details. If patient discharges prior to next session this note will serve as a discharge summary. Please see below for the latest assessment towards goals. 3-5 sessions per week   PT Equipment Recommendations  Equipment Needed:  (pt owns RW)    Assessment   Body structures, Functions, Activity limitations: Decreased functional mobility ; Decreased ADL status; Decreased balance;Decreased safe awareness;Decreased cognition;Decreased posture  Assessment: Pt continues to require reorientation to situation and reminders of hip precautions throughout all mobility. 2 person assistance for safety with both bed mobility and transfers. Able to take a few steps with RW but with decreased balance compared to previous session with abductor brace. Continued skilled PT to promote return towards PLOF. Treatment Diagnosis: decreased mobility secondary to hip dislocation  Prognosis: Fair  PT Education: PT Role;Transfer Training;Precautions; Functional Mobility Training  Patient Education: need reinforcement due to cognition  Barriers to Learning: cognition, hearing  REQUIRES PT FOLLOW UP: Yes  Activity Tolerance  Activity Tolerance: Patient limited by cognitive status; Patient limited by pain  Activity Tolerance: Neck pain limited mobility, constant reorientation needed due to decreased cognition     Patient Diagnosis(es): The primary encounter diagnosis was Dislocation of hip joint prosthesis, initial encounter ([facility] [address] A diagnosis of Hyponatremia was also pertinent to this visit. has a past medical history of CAD (coronary artery disease), Cervical disc disease, Cognitive communication deficit, Dementia ([facility] [address] Diabetes mellitus ([facility] [address] Hip fracture ([facility] [address] Hyperlipidemia, Hypertension, Lack of coordination, Muscle weakness, Other abnormalities of gait and mobility, Spinal stenosis, and Unsteadiness on feet.   has a past surgical history that includes Hip Closed Reduction (Right, 8/1/2021). Restrictions  Restrictions/Precautions  Restrictions/Precautions: Fall Risk, ROM Restrictions  Required Braces or Orthoses?: Yes (hip abduction pillow placed between legs)  Required Braces or Orthoses  Right Lower Extremity Brace:  (hip abductor pillow)  Position Activity Restriction  Hip Precautions: No hip flexion > 90 degrees, No hip internal rotation, No ADduction, No ABduction  Other position/activity restrictions: Kenroy Tim is a 80 y.o. male who presented to [facility] ED on 08/01 from a nursing facility after sustaining a right prosthetic hip dislocation. He sustained a right femoral neck fracture in early June 2021 and underwent anterior left total hip arthroplasty with Dr Gely Lobo at [facility] at that time. He has been doing well but has had issues with his bowels lately. His daughter believes that he dislocated his hip when getting above follow-up. He stays in a nursing facility and has dementia. He denies any other issues at this time.   Subjective   General  Chart Reviewed: Yes  Response To Previous Treatment: Patient unable to report, no changes reported from family or staff  Family / Caregiver Present: No  Subjective  Subjective: Agreeable to therapy session, reporting no pain at rest but then reporting increased pain in neck once mobility started. Pt did not rate pain level.  (moist heat applied to neck as well as supported with pillow). General Comment  Comments: supine in bed at arrival  Pain Screening  Patient Currently in Pain: Yes  Pain Assessment  Pain Assessment: 0-10  Pain Level: 8  Pain Type: Acute pain  Pain Location: Neck  Vital Signs  Patient Currently in Pain: Yes       Orientation  Orientation  Overall Orientation Status: Impaired  Orientation Level: Oriented to person  Cognition      Objective   Bed mobility  Supine to Sit: 2 Person assistance (max A of 2)  Scootin Person assistance (max A of 2)  Comment: Max cues regarding hip precautions with constant reminders. Abductor pillow placed between knees prior to mobility towards EOB as a safety measure. HOB elevated.   Transfers  Sit to Stand: 2 Person Assistance (mod A of 2 from elevated bed, assist to maintain RLE extended during transfer, max cues for hand placement and precautions)  Stand to sit: 2 Person Assistance (mod A of 2 to control descent/maintain hip precautions, assist to extend RLE during descent)  Ambulation  Ambulation?: Yes  WB Status: WBAT  Ambulation 1  Surface: level tile  Device: Rolling Walker  Assistance: 2 Person assistance (mod A of 2)  Quality of Gait: posterior and left lateral lean, varied foot placement B, flexed posture  Gait Deviations: Slow Elena;Decreased step length;Decreased step height  Distance: 4 steps fwd and back to chair  Stairs/Curb  Stairs?: No     Balance  Posture: Fair  Sitting - Static: Fair  Sitting - Dynamic: Fair  Standing - Static: Poor  Standing - Dynamic: Poor  Comments: CGA while seated EOB with tc to avoid hip flexion >90 deg  Exercises  Knee Long Arc Quad: x 10 B                        G-Code     OutComes Score                                                     AM-PAC Score  AM-PAC Inpatient Mobility Raw Score : 6 (21)  AM-PAC Inpatient T-Scale Score : 23.55 (21)  Mobility Inpatient CMS 0-100% Score: 100 (08/05/21 1436)  Mobility Inpatient CMS G-Code Modifier : CN (08/05/21 1436)          Goals--no goals met   Short term goals  Time Frame for Short term goals: to be met by discharge  Short term goal 1: pt able to perform bed mobility with mod A  Short term goal 2: pt able to perform STS transfers with mod A of 1  Short term goal 3: pt able to ambulate 20 ft with RW and mod A of 1  Patient Goals   Patient goals : to walk    Plan    Plan  Times per week: 7x/wk  Times per day: Daily  Current Treatment Recommendations: Strengthening, Balance Training, Functional Mobility Training, Transfer Training, Gait Training  Safety Devices  Type of devices:  All fall risk precautions in place, Call light within reach, Nurse notified, Chair alarm in place, Left in chair  Restraints  Initially in place: No     Therapy Time   Individual Concurrent Group Co-treatment   Time In       1356   Time Out       1430   Minutes       34   Timed Code Treatment Minutes: 11 [facility], [id number]

## 2021-08-05 NOTE — PROGRESS NOTES
Urology Progress Note  [facility]    Provider: SAADIA Guerrero CNP  Patient ID:  Admission Date: 2021 Name: Eliane Ferrer Date: 2021 MRN: [id number]   Patient Location: [id number] : 3/18/1929  Attending: Nora Roy MD Date of Service: 2021  PCP: Reza HALE     Diagnoses:  Urinary Retention, UTI    Assessment/Plan:  79 yo male hx of recent hip fracture s/p arthroplasty, CAD, advanced dementia, DM, BPH on flomax. Now s/p closed reduction of right hip this admission. His urologist is Dr. Romina Bucio with [facility]. He has been catheter dependent for the last x3 months. He had recent cysto/UDS 2021. Cysto revealed obstructive lateral prostatic lobes with a narrow bladder neck. UDS results suggest incomplete bladder emptying possibly 2/2 to chronic bladder outlet obstruction from BPH, some detrusor muscle underactively. Dr. Romina Bucio was planning TURP. His UA is concerning for infection with 65 wbc, yeast is present. Leukocytosis 14.5 today. Cr Stable.     Recommendations:  -New bowling placed 2021  -Abx and antifungals, cx pendings  -F/u with Dr. Romina Bucio with marleny x1 week to make sure urine is sterile prior to TURP  -Would be ok to discharge per  when medically clear  -Please call with any questions-      The patient had a chance to ask questions which were answered. he understands the above plan. Subjective:   Nick Benton is a 80 y.o. male. He was seen and examined this morning. Today we discussed Follow up at [id number] [facility] before TURP. Discussed bowling needs changed x4 weeks pending turp.       Objective:   Vitals:  Vitals:    21 0434   BP: (!) 145/55   Pulse: 84   Resp: 16   Temp: 97.5 °F (36.4 °C)   SpO2: 97%       Intake/Output Summary (Last 24 hours) at 2021 0850  Last data filed at 2021 1450  Gross per 24 hour   Intake 120 ml   Output 250 ml   Net -130 ml     Physical Exam:  Gen: Alert and oriented x3, no acute distress  CV: Regular rate Resp: unlabored respirations  Abd: Soft, non-distended, non-tender, no masses  Ext: no peripheral edema noted, moves upper and lower extremities spontaneously  Skin: warmand well perfused, no rashes noted on the face, or arms.      Labs:  Lab Results   Component Value Date    WBC 11.6 (H) 08/05/2021    HGB 7.2 (L) 08/05/2021    HCT 22.0 (L) 08/05/2021    MCV 93.2 08/05/2021     08/05/2021     Lab Results   Component Value Date    CREATININE 0.7 (L) 08/05/2021    BUN 16 08/05/2021     (L) 08/05/2021    K 4.4 08/05/2021     08/05/2021    CO2 23 08/05/2021       Davon Finger, APRN - CNP   8/5/2021

## 2021-08-05 NOTE — PROGRESS NOTES
Office : [phone]     Fax :[phone]       Nephrology  Progress Note      Patient's Name: Nick Benton  7:24 AM  8/5/2021    Reason for Consult:  Hyponatremia. Requesting Physician:  Claudia Curtis      Chief Complaint:    Chief Complaint   Patient presents with    Hip Pain     fall 3 weeks ago, R femur fx, sudden onset of R hip pain 1 hour ago, 10/10. History of Present iIlness:    Nick Benton is a 80 y.o. male with h/o dementia, recent hip fracture, status post hip arthroplasty, history of CAD, diabetes mellitus, BPH presents from nursing facility in view of hip dislocation. Patient status post OR for closed reduction of right hip with orthopedic surgery today. Medicine consulted for admission. Patient does not provide good history in view of dementia. Patient's daughter Shelley Mattson provides history as above. Patient currently states he is comfortable and denies pain. Has low sodium levels of 126  Has chronically low sodium levels     C/o mild headache  Able to respond appropriately to simple commands   No SOB   Daughter at bedside who gave most of his history   He lives at [facility]     Interval hx :    Awake , alert. Sodium level improved. Still has generalized weakness. I/O last 3 completed shifts:   In: 80 [P.O.:120]  Out: 250 [Urine:250]    Past Medical History:   Diagnosis Date    CAD (coronary artery disease)     Cervical disc disease     Cognitive communication deficit     Dementia ([address]     Diabetes mellitus ([address]     Hip fracture ([address]     Hyperlipidemia     Hypertension     Lack of coordination     Muscle weakness     Other abnormalities of gait and mobility     Spinal stenosis     Unsteadiness on feet        Current Medications: sodium chloride tablet 1 g, TID WC  fluconazole (DIFLUCAN) tablet 100 mg, Daily  0.9 % sodium chloride infusion, PRN  cefTRIAXone (ROCEPHIN) 1000 mg in sterile water 10 mL IV syringe, Q24H  Fixodent Complete CREA 1 applicator, Daily PRN  oxyCODONE-acetaminophen (PERCOCET) 5-325 MG per tablet 1 tablet, Once  sodium chloride flush 0.9 % injection 5-40 mL, 2 times per day  sodium chloride flush 0.9 % injection 5-40 mL, PRN  0.9 % sodium chloride infusion, PRN  enoxaparin (LOVENOX) injection 40 mg, Daily  ondansetron (ZOFRAN-ODT) disintegrating tablet 4 mg, Q8H PRN   Or  ondansetron (ZOFRAN) injection 4 mg, Q6H PRN  polyethylene glycol (GLYCOLAX) packet 17 g, Daily PRN  acetaminophen (TYLENOL) tablet 650 mg, Q6H PRN   Or  acetaminophen (TYLENOL) suppository 650 mg, Q6H PRN  potassium chloride (KLOR-CON M) extended release tablet 40 mEq, PRN   Or  potassium bicarb-citric acid (EFFER-K) effervescent tablet 40 mEq, PRN   Or  potassium chloride 10 mEq/100 mL IVPB (Peripheral Line), PRN  magnesium sulfate 2000 mg in 50 mL IVPB premix, PRN  famotidine (PEPCID) tablet 20 mg, Daily  aspirin EC tablet 81 mg, Daily  atorvastatin (LIPITOR) tablet 80 mg, Nightly  bisacodyl (DULCOLAX) suppository 10 mg, Daily PRN  bisacodyl (DULCOLAX) EC tablet 5 mg, Daily PRN  citalopram (CELEXA) tablet 10 mg, Daily  melatonin tablet 6 mg, Nightly PRN  tamsulosin (FLOMAX) capsule 0.4 mg, Daily  HYDROmorphone HCl PF (DILAUDID) injection 0.25 mg, Q3H PRN   Or  HYDROmorphone HCl PF (DILAUDID) injection 0.5 mg, Q3H PRN  oxyCODONE-acetaminophen (PERCOCET) 5-325 MG per tablet 1 tablet, Q4H PRN  insulin lispro (1 Unit Dial) 0-6 Units, TID WC  insulin lispro (1 Unit Dial) 0-3 Units, Nightly  glucose (GLUTOSE) 40 % oral gel 15 g, PRN  dextrose 50 % IV solution, PRN  glucagon (rDNA) injection 1 mg, PRN  dextrose 5 % solution, PRN      Physical exam:     Vitals:  BP (!) 145/55   Pulse 84   Temp 97.5 °F (36.4 °C) (Axillary)   Resp 16   Ht 5' 11\" (1.803 m)   Wt 161 lb (73 kg)   SpO2 97%   BMI 22.45 kg/m²   Constitutional: awake and alert   Skin: no rash, turgor wnl  Heent:  eomi, mmm  Neck: no bruits or jvd noted  Cardiovascular:  S1, S2 without m/r/g  Respiratory: CTA B without w/r/r  Abdomen:  +bs, soft, nt, nd  Ext: no  lower extremity edema      Labs:  CBC:   Recent Labs     08/03/21  0638 08/03/21 1753 08/04/21  0555   WBC 18.5* 16.7* 14.5*   HGB 7.0* 7.9* 7.5*    314 285     BMP:    Recent Labs     08/03/21  1753 08/04/21  0555 08/05/21  0621   * 127* 130*   K 4.9 4.1 4.4   CL 98* 97* 100   CO2 22 23 23   BUN 19 17 16   CREATININE 0.8 0.8 0.7*   GLUCOSE 165* 106* 101*     Ca/Mg/Phos:   Recent Labs     08/03/21  0638 08/03/21  5919 08/03/21 1753 08/04/21  0555 08/05/21  0621   CALCIUM 8.0*   < > 7.8* 7.7* 7.7*   MG 1.90  --   --  1.90 1.90   PHOS 3.3  --   --  2.8 3.2    < > = values in this interval not displayed. Hepatic: No results for input(s): AST, ALT, ALB, BILITOT, ALKPHOS in the last 72 hours. Troponin: No results for input(s): TROPONINI in the last 72 hours. BNP: No results for input(s): BNP in the last 72 hours. Lipids: No results for input(s): CHOL, TRIG, HDL, LDLCALC, LABVLDL in the last 72 hours. ABGs: No results for input(s): PHART, PO2ART, OUU2INX in the last 72 hours. INR: No results for input(s): INR in the last 72 hours.   UA:  Recent Labs     08/04/21  1906   COLORU DK YELLOW   CLARITYU CLOUDY*   GLUCOSEU Negative   BILIRUBINUR MODERATE*   KETUA TRACE*   SPECGRAV 1.029   BLOODU SMALL*   PHUR 5.5   PROTEINU 30*   UROBILINOGEN 1.0   NITRU Negative   LEUKOCYTESUR MODERATE*   LABMICR YES   Cleatus Sami NotGiven      Urine Microscopic:   Recent Labs     08/04/21  1906   COMU see below   HYALCAST 0-2   WBCUA 77*   RBCUA 3-4   EPIU 0     Urine Culture:   Recent Labs     08/03/21  1202   LABURIN Further report to follow     Urine Chemistry:   Recent Labs     08/04/21  0803   LABCREA 81.4   NAUR 103                IMAGING:  XR ABDOMEN (2 VIEWS)   Final Result   Findings suggest early/partial small bowel obstruction without evidence for   fecal impaction and very little formed stool present. FLUORO FOR SURGICAL PROCEDURES   Final Result      XR HIP LEFT (1 VIEW)   Final Result      XR PELVIS (1-2 VIEWS)   Final Result   Persistent superior dislocation of the right hip prosthesis. XR HIP RIGHT (1 VIEW)   Final Result   Dislocated right hip prosthesis. Assessment/Plan :      1. Hyponatremia   likely chronic   Has SIADH. Continue sodium chloride tablet 1 g p.o. 3 times daily. Monitor sodium levels       2. Dementia. Stable    3. Recent fall and hip dislocation. Ortho on board    4. Anemia. Has iron deficiency anemia   Monitor hemoglobin level. Transfuse if hemoglobin less than 7  Will start iv venofer.        D/w primary team      Thank you for allowing us to participate in care of Oscar Harry         Electronically signed by: Rustam Flores MD, 8/5/2021, 7:24 AM      [facility]  Office : [phone]  Fax :[phone]

## 2021-08-06 VITALS
HEIGHT: 71 IN | SYSTOLIC BLOOD PRESSURE: 158 MMHG | BODY MASS INDEX: 22.54 KG/M2 | TEMPERATURE: 97.7 F | OXYGEN SATURATION: 96 % | HEART RATE: 78 BPM | WEIGHT: 161 LBS | RESPIRATION RATE: 12 BRPM | DIASTOLIC BLOOD PRESSURE: 68 MMHG

## 2021-08-06 LAB
ANION GAP SERPL CALCULATED.3IONS-SCNC: 8 MMOL/L (ref 3–16)
BASOPHILS ABSOLUTE: 0.1 K/UL (ref 0–0.2)
BASOPHILS RELATIVE PERCENT: 0.6 %
BUN BLDV-MCNC: 14 MG/DL (ref 7–20)
CALCIUM SERPL-MCNC: 7.9 MG/DL (ref 8.3–10.6)
CHLORIDE BLD-SCNC: 99 MMOL/L (ref 99–110)
CO2: 24 MMOL/L (ref 21–32)
CREAT SERPL-MCNC: 0.7 MG/DL (ref 0.8–1.3)
EKG ATRIAL RATE: 57 BPM
EKG DIAGNOSIS: NORMAL
EKG P AXIS: 26 DEGREES
EKG P-R INTERVAL: 280 MS
EKG Q-T INTERVAL: 444 MS
EKG QRS DURATION: 146 MS
EKG QTC CALCULATION (BAZETT): 432 MS
EKG R AXIS: 23 DEGREES
EKG T AXIS: 3 DEGREES
EKG VENTRICULAR RATE: 57 BPM
EOSINOPHILS ABSOLUTE: 0.4 K/UL (ref 0–0.6)
EOSINOPHILS RELATIVE PERCENT: 3.5 %
GFR AFRICAN AMERICAN: >60
GFR NON-AFRICAN AMERICAN: >60
GLUCOSE BLD-MCNC: 105 MG/DL (ref 70–99)
GLUCOSE BLD-MCNC: 108 MG/DL (ref 70–99)
GLUCOSE BLD-MCNC: 164 MG/DL (ref 70–99)
GLUCOSE BLD-MCNC: 96 MG/DL (ref 70–99)
HCT VFR BLD CALC: 22.8 % (ref 40.5–52.5)
HEMOGLOBIN: 7.6 G/DL (ref 13.5–17.5)
LYMPHOCYTES ABSOLUTE: 1.1 K/UL (ref 1–5.1)
LYMPHOCYTES RELATIVE PERCENT: 10.1 %
MAGNESIUM: 1.9 MG/DL (ref 1.8–2.4)
MCH RBC QN AUTO: 31 PG (ref 26–34)
MCHC RBC AUTO-ENTMCNC: 33.5 G/DL (ref 31–36)
MCV RBC AUTO: 92.5 FL (ref 80–100)
MONOCYTES ABSOLUTE: 0.9 K/UL (ref 0–1.3)
MONOCYTES RELATIVE PERCENT: 8.2 %
NEUTROPHILS ABSOLUTE: 8.8 K/UL (ref 1.7–7.7)
NEUTROPHILS RELATIVE PERCENT: 77.6 %
PDW BLD-RTO: 16.5 % (ref 12.4–15.4)
PERFORMED ON: ABNORMAL
PHOSPHORUS: 3.1 MG/DL (ref 2.5–4.9)
PLATELET # BLD: 307 K/UL (ref 135–450)
PLATELET SLIDE REVIEW: ADEQUATE
PMV BLD AUTO: 7.1 FL (ref 5–10.5)
POTASSIUM SERPL-SCNC: 4.5 MMOL/L (ref 3.5–5.1)
RBC # BLD: 2.47 M/UL (ref 4.2–5.9)
SODIUM BLD-SCNC: 131 MMOL/L (ref 136–145)
TROPONIN: 0.03 NG/ML
TROPONIN: 0.03 NG/ML
WBC # BLD: 11.3 K/UL (ref 4–11)

## 2021-08-06 PROCEDURE — 97530 THERAPEUTIC ACTIVITIES: CPT

## 2021-08-06 PROCEDURE — 84100 ASSAY OF PHOSPHORUS: CPT

## 2021-08-06 PROCEDURE — 6360000002 HC RX W HCPCS: Performed by: INTERNAL MEDICINE

## 2021-08-06 PROCEDURE — 99233 SBSQ HOSP IP/OBS HIGH 50: CPT | Performed by: INTERNAL MEDICINE

## 2021-08-06 PROCEDURE — 6370000000 HC RX 637 (ALT 250 FOR IP): Performed by: INTERNAL MEDICINE

## 2021-08-06 PROCEDURE — 36415 COLL VENOUS BLD VENIPUNCTURE: CPT

## 2021-08-06 PROCEDURE — 84484 ASSAY OF TROPONIN QUANT: CPT

## 2021-08-06 PROCEDURE — 2580000003 HC RX 258: Performed by: INTERNAL MEDICINE

## 2021-08-06 PROCEDURE — 83735 ASSAY OF MAGNESIUM: CPT

## 2021-08-06 PROCEDURE — 93005 ELECTROCARDIOGRAM TRACING: CPT | Performed by: INTERNAL MEDICINE

## 2021-08-06 PROCEDURE — 80048 BASIC METABOLIC PNL TOTAL CA: CPT

## 2021-08-06 PROCEDURE — 99223 1ST HOSP IP/OBS HIGH 75: CPT | Performed by: INTERNAL MEDICINE

## 2021-08-06 PROCEDURE — 93010 ELECTROCARDIOGRAM REPORT: CPT | Performed by: INTERNAL MEDICINE

## 2021-08-06 PROCEDURE — 85025 COMPLETE CBC W/AUTO DIFF WBC: CPT

## 2021-08-06 RX ORDER — AMLODIPINE BESYLATE 5 MG/1
5 TABLET ORAL DAILY
Qty: 30 TABLET | Refills: 1
Start: 2021-08-07

## 2021-08-06 RX ORDER — FERROUS SULFATE 325(65) MG
325 TABLET ORAL
Qty: 30 TABLET | Refills: 1
Start: 2021-08-06

## 2021-08-06 RX ORDER — AMLODIPINE BESYLATE 5 MG/1
5 TABLET ORAL DAILY
Qty: 30 TABLET | Refills: 1 | Status: SHIPPED | OUTPATIENT
Start: 2021-08-07 | End: 2021-08-06

## 2021-08-06 RX ORDER — AMOXICILLIN 500 MG/1
500 CAPSULE ORAL EVERY 8 HOURS SCHEDULED
Qty: 15 CAPSULE | Refills: 0 | Status: SHIPPED | OUTPATIENT
Start: 2021-08-06 | End: 2021-08-11

## 2021-08-06 RX ORDER — FLUCONAZOLE 100 MG/1
100 TABLET ORAL DAILY
Qty: 2 TABLET | Refills: 0 | Status: SHIPPED | OUTPATIENT
Start: 2021-08-06 | End: 2021-08-08

## 2021-08-06 RX ORDER — SODIUM CHLORIDE 1000 MG
1 TABLET, SOLUBLE MISCELLANEOUS 2 TIMES DAILY
Qty: 14 TABLET | Refills: 0 | Status: SHIPPED | OUTPATIENT
Start: 2021-08-06 | End: 2021-08-13

## 2021-08-06 RX ORDER — AMLODIPINE BESYLATE 5 MG/1
5 TABLET ORAL DAILY
Status: DISCONTINUED | OUTPATIENT
Start: 2021-08-07 | End: 2021-08-06 | Stop reason: HOSPADM

## 2021-08-06 RX ADMIN — COLLAGENASE SANTYL: 250 OINTMENT TOPICAL at 13:04

## 2021-08-06 RX ADMIN — TAMSULOSIN HYDROCHLORIDE 0.4 MG: 0.4 CAPSULE ORAL at 08:58

## 2021-08-06 RX ADMIN — SODIUM CHLORIDE TAB 1 GM 1 G: 1 TAB at 13:03

## 2021-08-06 RX ADMIN — Medication 10 ML: at 10:02

## 2021-08-06 RX ADMIN — ENOXAPARIN SODIUM 40 MG: 40 INJECTION SUBCUTANEOUS at 08:57

## 2021-08-06 RX ADMIN — AMOXICILLIN 500 MG: 250 CAPSULE ORAL at 13:32

## 2021-08-06 RX ADMIN — FLUCONAZOLE 100 MG: 100 TABLET ORAL at 08:58

## 2021-08-06 RX ADMIN — Medication 10 ML: at 00:46

## 2021-08-06 RX ADMIN — FAMOTIDINE 20 MG: 20 TABLET ORAL at 09:00

## 2021-08-06 RX ADMIN — OXYCODONE HYDROCHLORIDE AND ACETAMINOPHEN 1 TABLET: 5; 325 TABLET ORAL at 04:33

## 2021-08-06 RX ADMIN — CITALOPRAM HYDROBROMIDE 10 MG: 20 TABLET ORAL at 08:59

## 2021-08-06 RX ADMIN — AMOXICILLIN 500 MG: 250 CAPSULE ORAL at 06:45

## 2021-08-06 RX ADMIN — ASPIRIN 81 MG: 81 TABLET, COATED ORAL at 09:00

## 2021-08-06 RX ADMIN — SODIUM CHLORIDE TAB 1 GM 1 G: 1 TAB at 09:01

## 2021-08-06 RX ADMIN — IRON SUCROSE 200 MG: 20 INJECTION, SOLUTION INTRAVENOUS at 12:57

## 2021-08-06 RX ADMIN — AMLODIPINE BESYLATE 2.5 MG: 5 TABLET ORAL at 09:00

## 2021-08-06 ASSESSMENT — PAIN DESCRIPTION - LOCATION
LOCATION: HEAD
LOCATION: NECK

## 2021-08-06 ASSESSMENT — PAIN SCALES - GENERAL
PAINLEVEL_OUTOF10: 8
PAINLEVEL_OUTOF10: 4

## 2021-08-06 ASSESSMENT — PAIN DESCRIPTION - PAIN TYPE: TYPE: CHRONIC PAIN

## 2021-08-06 NOTE — PROGRESS NOTES
Physician Progress Note      PATIENT:               Luana Duff  CSN #:                  [id number]  :                       3/18/1929  ADMIT DATE:       2021 3:21 AM  DISCH DATE:  RESPONDING  PROVIDER #:        Magaly Coleman MD          QUERY TEXT:    Dear Dr. Michelle Funk,    Pt noted with UTI. Pt noted to have chronic indwelling urinary catheter on   admission. If possible, please document in the progress notes and discharge   summary if you are evaluating and/or treating any of the following: The medical record reflects the following:  Risk Factors: Urinary retention with chronic catheter  Clinical Indicators: Patient noted with leukocytosis, no fever, with chronic   catheter, UA obtained that was abnormal, waiting on culture. Culture now final   with >100,000 CFU/ml Enterococcus faecalis. Treatment: Ceftriaxone, Diflucan, cultures, lab work, Urology consulted for   retention, Ampicillin. Thank you  Mary Ann Smith RN CDS  Options provided:  -- UTI due to chronic indwelling urinary catheter  -- Other - I will add my own diagnosis  -- Disagree - Not applicable / Not valid  -- Disagree - Clinically unable to determine / Unknown  -- Refer to Clinical Documentation Reviewer    PROVIDER RESPONSE TEXT:    UTI is due to the chronic indwelling urinary catheter.     Query created by: Bailey Carrero on 2021 1:07 PM      Electronically signed by:  Magaly Coleman MD 2021 2:39 PM

## 2021-08-06 NOTE — PROGRESS NOTES
Comprehensive Nutrition Assessment    Type and Reason for Visit:  Reassess    Nutrition Recommendations/Plan:   Con't current plan of care    Nutrition Assessment:  Pt remains nutritionally compromised AEB variable po intake with increased nutrient needs for wound healing. Pt has a large open diabetic wound on rt heel. Receives Glucerna to promote healing. Po intake varies d/t dementia. WIll con't to monitor & encourage po & supp intake. Malnutrition Assessment:  Malnutrition Status:  Insufficient data      Estimated Daily Nutrient Needs:  Energy (kcal):  1800- 2160 (25-30 kcal/72kg); Weight Used for Energy Requirements:   (72 kg)     Protein (g):  86- 108g (1.2-1.5g/72kg); Weight Used for Protein Requirements:  Current        Fluid (ml/day):   ; Method Used for Fluid Requirements:  1 ml/kcal      Nutrition Related Findings:  LBM 8/6; gluc 108      Wounds:  Diabetic Ulcer, Surgical Incision, Open Wounds       Current Nutrition Therapies:    ADULT DIET; Regular; 5 carb choices (75 gm/meal); 1800 ml  Adult Oral Nutrition Supplement; Diabetic Oral Supplement    Anthropometric Measures:  · Height: 5' 11\" (180.3 cm)  · Current Body Weight: 161 lb (73 kg)   · Admission Body Weight:      · Usual Body Weight:       · Ideal Body Weight: 172 lbs; % Ideal Body Weight 93.6 %   · BMI: 22.5  · Adjusted Body Weight:  ; No Adjustment   · Adjusted BMI:      · BMI Categories: Normal Weight (BMI 18.5-24. 9)       Nutrition Diagnosis:   · Inadequate oral intake related to inadequate protein-energy intake as evidenced by intake 0-25%    · Increased nutrient needs related to increase demand for energy/nutrients as evidenced by wounds      Nutrition Interventions:   Food and/or Nutrient Delivery:  Continue Current Diet, Continue Oral Nutrition Supplement  Nutrition Education/Counseling:  Education not indicated   Coordination of Nutrition Care:  Continue to monitor while inpatient    Goals:  po intake at least 50% of meals & supplements       Nutrition Monitoring and Evaluation:   Behavioral-Environmental Outcomes:  None Identified   Food/Nutrient Intake Outcomes:  Food and Nutrient Intake, Supplement Intake  Physical Signs/Symptoms Outcomes:  Weight, Skin     Discharge Planning:    Continue Oral Nutrition Supplement     Electronically signed by Xin Crum RD, LD on 8/6/21 at 2:02 PM EDT    Contact: [phone]

## 2021-08-06 NOTE — PROGRESS NOTES
Shift assessment completed. Medications given per MAR. Patient  Alert to self. Resting In bed  Family at bedside. Chronic bowling in place. Denies any need to pain medication at this time. Pt expressed no other needs at this time. Will continue to educate patient as needed.      Fall precautions in place, hourly rounding, call light and belongings in reach, bed in lowest position, wheels locked in place, side rails up x 2, walkways free of clutter

## 2021-08-06 NOTE — PROGRESS NOTES
secondary to decreased cognition  Safety Devices  Safety Devices in place: Yes  Type of devices: All fall risk precautions in place; Left in chair;Call light within reach;Nurse notified; Chair alarm in place;Gait belt;Patient at risk for falls (abductor pillow in place)         Patient Diagnosis(es): The primary encounter diagnosis was Dislocation of hip joint prosthesis, initial encounter ([facility] A diagnosis of Hyponatremia was also pertinent to this visit. has a past medical history of CAD (coronary artery disease), Cervical disc disease, Cognitive communication deficit, Dementia ([facility] Diabetes mellitus ([facility] Hip fracture ([facility] Hyperlipidemia, Hypertension, Lack of coordination, Muscle weakness, Other abnormalities of gait and mobility, Spinal stenosis, and Unsteadiness on feet.   has a past surgical history that includes Hip Closed Reduction (Right, 8/1/2021). Restrictions  Restrictions/Precautions  Restrictions/Precautions: Fall Risk, ROM Restrictions, Weight Bearing  Required Braces or Orthoses?: Yes (hip abduction pillow placed between legs)  Lower Extremity Weight Bearing Restrictions  Right Lower Extremity Weight Bearing: Weight Bearing As Tolerated  Partial Weight Bearing Percentage Or Pounds: Per note from Lewis Medina  on 8/3 family declining hip abduction brace. Patient WBAT. Abductor pillow in place when not ambulating  Required Braces or Orthoses  Right Lower Extremity Brace:  (hip abductor pillow)  Position Activity Restriction  Hip Precautions: No hip flexion > 90 degrees, No hip internal rotation, No ADduction, No ABduction  Other position/activity restrictions: Kenroy Tim is a 80 y.o. male who presented to [facility] ED on 08/01 from a nursing facility after sustaining a right prosthetic hip dislocation. He sustained a right femoral neck fracture in early June 2021 and underwent anterior left total hip arthroplasty with Dr Gely Lobo at [facility] at that time.  He has memory  Safety Judgement: Decreased awareness of need for safety;Decreased awareness of need for assistance  Problem Solving: Decreased awareness of errors  Insights: Decreased awareness of deficits  Initiation: Requires cues for some  Sequencing: Requires cues for some                                         Plan   Plan  Times per week: 5-7  Times per day: Daily  Current Treatment Recommendations: Strengthening, Balance Training, Functional Mobility Training, Endurance Training, Safety Education & Training, Self-Care / ADL, Cognitive Reorientation, Equipment Evaluation, Education, & procurement, Patient/Caregiver Education & Training, Home Management Training    AM-PAC Score        AM-PAC Inpatient Daily Activity Raw Score: 12 (08/06/21 1429)  AM-PAC Inpatient ADL T-Scale Score : 30.6 (08/06/21 1429)  ADL Inpatient CMS 0-100% Score: 66.57 (08/06/21 1429)  ADL Inpatient CMS G-Code Modifier : CL (08/06/21 1429)    Goals  Short term goals  Time Frame for Short term goals: Discharge  Short term goal 1: Bed mobility modA---- ongoing 08/06  Short term goal 2: Functional ADL transfers Dorthula Gaucher---- ongoing 08/06  Short term goal 3: Functional mobility with appropriate AD min to Dorthula Gaucher---- ongoing 08/06  Short term goal 4: Simple UB ADLs setup---- ongoing 08/06  Short term goal 5: Stance x 3 minutes Roshni to assist with ADLs---- ongoing 08/06  Long term goals  Time Frame for Long term goals : LTG=STG  Patient Goals   Patient goals : Not stated       Therapy Time   Individual Concurrent Group Co-treatment   Time In       1230   Time Out       1253   Minutes       23        Timed Code Treatment Minutes:  23 Minutes    Total Treatment Minutes:  Anna, [address] [place], [place] [address] [address]

## 2021-08-06 NOTE — PLAN OF CARE
Problem: Falls - Risk of:  Goal: Will remain free from falls  Description: Will remain free from falls  Outcome: Ongoing  Goal: Absence of physical injury  Description: Absence of physical injury  Outcome: Ongoing     Problem: Pain:  Goal: Pain level will decrease  Description: Pain level will decrease  Outcome: Ongoing  Goal: Control of acute pain  Description: Control of acute pain  Outcome: Ongoing  Goal: Control of chronic pain  Description: Control of chronic pain  Outcome: Ongoing     Problem: Skin Integrity:  Goal: Will show no infection signs and symptoms  Description: Will show no infection signs and symptoms  Outcome: Ongoing  Goal: Absence of new skin breakdown  Description: Absence of new skin breakdown  Outcome: Ongoing     Problem: Nutrition  Goal: Optimal nutrition therapy  Outcome: Ongoing     Fall precautions in place, hourly rounding, call light and belongings in reach, bed in lowest position, wheels locked in place, side rails up x 2, walkways free of clutter    Skin remains dry and intact, no signs of breakdown noted. Patient encouraged to reposition every 2 hours and is assisted to do so when unable to reposition independently. Pain assessed using 0-10 scale, patient able to voice pain level and states pain improved with prn medication administered.

## 2021-08-06 NOTE — PROGRESS NOTES
Physical Therapy  Facility/Department: [address] ORTHO/NEURO NURSING  Daily Treatment Note  NAME: Garrison Taylor  : 3/18/1929  MRN: [id number]    Date of Service: 2021    Discharge Recommendations:Donald Dorothye Sacks scored a  on the AM-PAC short mobility form. Current research shows that an AM-PAC score of 17 or less is typically not associated with a discharge to the patient's home setting. Based on the patient's AM-PAC score and their current functional mobility deficits, it is recommended that the patient have 3-5 sessions per week of Physical Therapy at d/c to increase the patient's independence. Please see assessment section for further patient specific details. If patient discharges prior to next session this note will serve as a discharge summary. Please see below for the latest assessment towards goals. 3-5 sessions per week   PT Equipment Recommendations  Equipment Needed: No    Assessment   Body structures, Functions, Activity limitations: Decreased functional mobility ; Decreased ADL status; Decreased balance;Decreased safe awareness;Decreased cognition;Decreased posture  Assessment: Pt continues to require reorientation to situation and reminders of hip precautions throughout all mobility. 2 person assistance for safety with both bed mobility and transfers. Able to take a few steps with RW from bed to chair. Continued skilled PT to promote return towards PLOF. Treatment Diagnosis: decreased mobility secondary to hip dislocation  Prognosis: Fair  PT Education: PT Role;Transfer Training;Precautions; Functional Mobility Training  Patient Education: need reinforcement due to cognition  Barriers to Learning: cognition, hearing  REQUIRES PT FOLLOW UP: Yes  Activity Tolerance  Activity Tolerance: Patient limited by cognitive status; Patient limited by pain  Activity Tolerance: Neck pain limited mobility, constant reorientation needed due to decreased cognition     Patient Diagnosis(es): The primary encounter diagnosis was Dislocation of hip joint prosthesis, initial encounter ([facility] [address] A diagnosis of Hyponatremia was also pertinent to this visit. has a past medical history of CAD (coronary artery disease), Cervical disc disease, Cognitive communication deficit, Dementia ([facility] [address] Diabetes mellitus ([facility] [address] Hip fracture ([facility] [address] Hyperlipidemia, Hypertension, Lack of coordination, Muscle weakness, Other abnormalities of gait and mobility, Spinal stenosis, and Unsteadiness on feet.   has a past surgical history that includes Hip Closed Reduction (Right, 8/1/2021). Restrictions  Restrictions/Precautions  Restrictions/Precautions: Fall Risk, ROM Restrictions, Weight Bearing  Required Braces or Orthoses?: Yes (hip abduction pillow placed between legs)  Lower Extremity Weight Bearing Restrictions  Right Lower Extremity Weight Bearing: Weight Bearing As Tolerated  Partial Weight Bearing Percentage Or Pounds: Per note from Brent Vargas  on 8/3 family declining hip abduction brace. Patient WBAT. Abductor pillow in place when not ambulating  Required Braces or Orthoses  Right Lower Extremity Brace:  (hip abductor pillow)  Position Activity Restriction  Hip Precautions: No hip flexion > 90 degrees, No hip internal rotation, No ADduction, No ABduction  Other position/activity restrictions: Zoila Rojas is a 80 y.o. male who presented to [facility] ED on 08/01 from a nursing facility after sustaining a right prosthetic hip dislocation. He sustained a right femoral neck fracture in early June 2021 and underwent anterior left total hip arthroplasty with Dr Lin Power at [facility] at that time. He has been doing well but has had issues with his bowels lately. His daughter believes that he dislocated his hip when getting above follow-up. He stays in a nursing facility and has dementia. He denies any other issues at this time.      Subjective   General  Chart Reviewed: Yes  Response To Previous Treatment: Patient unable to report, no changes reported from family or staff  Family / Caregiver Present: No  Subjective  Subjective: Pt reports neck pain, sits with significantly flexed posture; pt left in chair at end of session with heating pad in place, pillows to support cervical spine  General Comment  Comments: Pt sitting on Stedy with nursing staff upon arrival to room; PT/OT took over care; pt agreeable to therapy          Orientation  Orientation  Overall Orientation Status: Impaired  Orientation Level: Oriented to person     Cognition   Cognition  Overall Cognitive Status: Exceptions  Arousal/Alertness: Delayed responses to stimuli  Following Commands: Follows one step commands with increased time; Follows one step commands with repetition  Attention Span: Difficulty dividing attention; Difficulty attending to directions  Memory: Decreased recall of precautions;Decreased recall of recent events;Decreased short term memory  Safety Judgement: Decreased awareness of need for safety;Decreased awareness of need for assistance  Problem Solving: Decreased awareness of errors  Insights: Decreased awareness of deficits  Initiation: Requires cues for some  Sequencing: Requires cues for some     Objective   Bed mobility  Comment: not observed this date  Transfers  Sit to Stand: Dependent/Total;2 Person Assistance (min A of 2, 2x)  Stand to sit: Dependent/Total;2 Person Assistance (min A of 2)  Ambulation  Ambulation?: Yes  WB Status: WBAT  Ambulation 1  Surface: level tile  Device: Rolling Walker  Assistance: 2 Person assistance (min A of 2)  Quality of Gait: pt able to take small steps towards chair, turning to back up to chair  Gait Deviations: Slow Elena;Decreased step length;Decreased step height  Distance: 3-4 ft from bed to chair placed slightly away from bedside to attempt short distance ambulation today  Stairs/Curb  Stairs?: No     Balance  Posture: Fair  Sitting - Static: Fair  Sitting - Dynamic: Poor  Standing - Static: Poor  Standing - Dynamic: Poor  Comments: CGA while seated EOB with tc to avoid hip flexion >90 deg; stood with left lean, min A of 2 with RW; stood at bedside for 30 sec to take small sidesteps towards [facility]; then stood for 2 min to amb to chair                AM-PAC Score  AM-PAC Inpatient Mobility Raw Score : 6 (08/06/21 1430)  AM-PAC Inpatient T-Scale Score : 23.55 (08/06/21 1430)  Mobility Inpatient CMS 0-100% Score: 100 (08/06/21 1430)  Mobility Inpatient CMS G-Code Modifier : CN (08/06/21 1430)          Goals  Short term goals  Time Frame for Short term goals: to be met by discharge  Short term goal 1: pt able to perform bed mobility with mod A  Short term goal 2: pt able to perform STS transfers with mod A of 1  Short term goal 3: pt able to ambulate 20 ft with RW and mod A of 1  Patient Goals   Patient goals : to walk  No goals met    Plan    Plan  Times per week: 7x/wk  Times per day: Daily  Current Treatment Recommendations: Strengthening, Balance Training, Functional Mobility Training, Transfer Training, Gait Training  Safety Devices  Type of devices:  All fall risk precautions in place, Call light within reach, Chair alarm in place, Gait belt, Nurse notified, Telesitter in use, Patient at risk for falls, Left in chair Leona Bennett RN; Abductor pillow in place after session)  Restraints  Initially in place: No     Therapy Time   Individual Concurrent Group Co-treatment   Time In       1230   Time Out       1253   Minutes       23   Timed Code Treatment Minutes: [address] [facility], [address] [facility], [address] [facility]

## 2021-08-06 NOTE — PROGRESS NOTES
Office : [phone]     Fax :[phone]       Nephrology  Progress Note      Patient's Name: April Lr  8:50 AM  8/6/2021    Reason for Consult:  Hyponatremia. Requesting Physician:  Taz Castillo      Chief Complaint:    Chief Complaint   Patient presents with    Hip Pain     fall 3 weeks ago, R femur fx, sudden onset of R hip pain 1 hour ago, 10/10. History of Present iIlness:    April Lr is a 80 y.o. male with h/o dementia, recent hip fracture, status post hip arthroplasty, history of CAD, diabetes mellitus, BPH presents from nursing facility in view of hip dislocation. Patient status post OR for closed reduction of right hip with orthopedic surgery today. Medicine consulted for admission. Patient does not provide good history in view of dementia. Patient's daughter [place] provides history as above. Patient currently states he is comfortable and denies pain. Has low sodium levels of 126  Has chronically low sodium levels     C/o mild headache  Able to respond appropriately to simple commands   No SOB   Daughter at bedside who gave most of his history   He lives at [facility]     Interval hx :    Awake , alert. Sodium level improved. No new complaints today         I/O last 3 completed shifts:   In: 100 [IV Piggyback:100]  Out: 12 [Urine:450]    Past Medical History:   Diagnosis Date    CAD (coronary artery disease)     Cervical disc disease     Cognitive communication deficit     Dementia ([address]     Diabetes mellitus ([address]     Hip fracture ([address]     Hyperlipidemia     Hypertension     Lack of coordination     Muscle weakness     Other abnormalities of gait and mobility     Spinal stenosis     Unsteadiness on feet        Current Medications: iron sucrose (VENOFER) 200 mg in sodium chloride 0.9 % 100 mL IVPB, Q24H  amLODIPine (NORVASC) tablet 2.5 mg, Daily  amoxicillin (AMOXIL) capsule 500 mg, 3 times per day  sodium chloride tablet 1 g, TID WC  fluconazole (DIFLUCAN) tablet 100 mg, Daily  0.9 % sodium chloride infusion, PRN  Fixodent Complete CREA 1 applicator, Daily PRN  oxyCODONE-acetaminophen (PERCOCET) 5-325 MG per tablet 1 tablet, Once  sodium chloride flush 0.9 % injection 5-40 mL, 2 times per day  sodium chloride flush 0.9 % injection 5-40 mL, PRN  0.9 % sodium chloride infusion, PRN  enoxaparin (LOVENOX) injection 40 mg, Daily  ondansetron (ZOFRAN-ODT) disintegrating tablet 4 mg, Q8H PRN   Or  ondansetron (ZOFRAN) injection 4 mg, Q6H PRN  polyethylene glycol (GLYCOLAX) packet 17 g, Daily PRN  acetaminophen (TYLENOL) tablet 650 mg, Q6H PRN   Or  acetaminophen (TYLENOL) suppository 650 mg, Q6H PRN  potassium chloride (KLOR-CON M) extended release tablet 40 mEq, PRN   Or  potassium bicarb-citric acid (EFFER-K) effervescent tablet 40 mEq, PRN   Or  potassium chloride 10 mEq/100 mL IVPB (Peripheral Line), PRN  magnesium sulfate 2000 mg in 50 mL IVPB premix, PRN  famotidine (PEPCID) tablet 20 mg, Daily  aspirin EC tablet 81 mg, Daily  atorvastatin (LIPITOR) tablet 80 mg, Nightly  bisacodyl (DULCOLAX) suppository 10 mg, Daily PRN  bisacodyl (DULCOLAX) EC tablet 5 mg, Daily PRN  citalopram (CELEXA) tablet 10 mg, Daily  melatonin tablet 6 mg, Nightly PRN  tamsulosin (FLOMAX) capsule 0.4 mg, Daily  HYDROmorphone HCl PF (DILAUDID) injection 0.25 mg, Q3H PRN   Or  HYDROmorphone HCl PF (DILAUDID) injection 0.5 mg, Q3H PRN  oxyCODONE-acetaminophen (PERCOCET) 5-325 MG per tablet 1 tablet, Q4H PRN  insulin lispro (1 Unit Dial) 0-6 Units, TID WC  insulin lispro (1 Unit Dial) 0-3 Units, Nightly  glucose (GLUTOSE) 40 % oral gel 15 g, PRN  dextrose 50 % IV solution, PRN  glucagon (rDNA) injection 1 mg, PRN  dextrose 5 % solution, PRN      Physical exam: Vitals:  BP (!) 158/68   Pulse 50   Temp 97.7 °F (36.5 °C) (Oral)   Resp 12   Ht 5' 11\" (1.803 m)   Wt 161 lb (73 kg)   SpO2 96%   BMI 22.45 kg/m²   Constitutional: awake and alert   Skin: no rash, turgor wnl  Heent:  eomi, mmm  Neck: no bruits or jvd noted  Cardiovascular:  S1, S2 without m/r/g  Respiratory: CTA B without w/r/r  Abdomen:  +bs, soft, nt, nd  Ext: no  lower extremity edema      Labs:  CBC:   Recent Labs     08/04/21  0555 08/05/21  0621 08/06/21  0608   WBC 14.5* 11.6* 11.3*   HGB 7.5* 7.2* 7.6*    276 307     BMP:    Recent Labs     08/04/21  0555 08/05/21  0621 08/06/21  0608   * 130* 131*   K 4.1 4.4 4.5   CL 97* 100 99   CO2 23 23 24   BUN 17 16 14   CREATININE 0.8 0.7* 0.7*   GLUCOSE 106* 101* 96     Ca/Mg/Phos:   Recent Labs     08/04/21  0555 08/05/21  0621 08/06/21  0608   CALCIUM 7.7* 7.7* 7.9*   MG 1.90 1.90 1.90   PHOS 2.8 3.2 3.1     Hepatic: No results for input(s): AST, ALT, ALB, BILITOT, ALKPHOS in the last 72 hours. Troponin: No results for input(s): TROPONINI in the last 72 hours. BNP: No results for input(s): BNP in the last 72 hours. Lipids: No results for input(s): CHOL, TRIG, HDL, LDLCALC, LABVLDL in the last 72 hours. ABGs: No results for input(s): PHART, PO2ART, EXV9ONG in the last 72 hours. INR: No results for input(s): INR in the last 72 hours.   UA:  Recent Labs     08/04/21  1906   COLORU DK YELLOW   CLARITYU CLOUDY*   GLUCOSEU Negative   BILIRUBINUR MODERATE*   KETUA TRACE*   SPECGRAV 1.029   BLOODU SMALL*   PHUR 5.5   PROTEINU 30*   UROBILINOGEN 1.0   NITRU Negative   LEUKOCYTESUR MODERATE*   LABMICR YES   URINETYPE NotGiven      Urine Microscopic:   Recent Labs     08/04/21 1906   COMU see below   HYALCAST 0-2   WBCUA 77*   RBCUA 3-4   EPIU 0     Urine Culture:   Recent Labs     08/04/21 1906   LABURIN No growth at 18 to 36 hours     Urine Chemistry:   Recent Labs     08/04/21  0803   LABCREA 81.4   NAUR 103                IMAGING:  XR ABDOMEN (2 VIEWS)   Final Result   Findings suggest early/partial small bowel obstruction without evidence for   fecal impaction and very little formed stool present. FLUORO FOR SURGICAL PROCEDURES   Final Result      XR HIP LEFT (1 VIEW)   Final Result      XR PELVIS (1-2 VIEWS)   Final Result   Persistent superior dislocation of the right hip prosthesis. XR HIP RIGHT (1 VIEW)   Final Result   Dislocated right hip prosthesis. Assessment/Plan :      1. Hyponatremia   likely chronic   Has SIADH. Continue sodium chloride tablet 1 g p.o. 2  times daily x 1 week  Check BMP in one week at [facility]  and fax results to me . Monitor sodium levels       2. Dementia. Stable    3. Recent fall and hip dislocation. Ortho on board    4. Anemia. Has iron deficiency anemia   Got iv venofer.   When discharged start ferrous sulfate PO      D/w primary team      Thank you for allowing us to participate in care of Siena Ray         Electronically signed by: Lisa Olvera MD, 8/6/2021, 8:50 AM      [facility] [address]  Office : [phone]  Fax :[phone]

## 2021-08-06 NOTE — PROGRESS NOTES
Urology Progress Note  [facility]    Provider: SAADIA Low CNP  Patient ID:  Admission Date: 2021 Name: Elena Leyva Date: 2021 MRN: [id number]   Patient Location: [id number] : 3/18/1929  Attending: Emelia Kanner, MD Date of Service: 2021  PCP: Destiny HALE     Diagnoses:  Urinary Retention, UTI    Assessment/Plan:  81 yo male hx of recent hip fracture s/p arthroplasty, CAD, advanced dementia, DM, BPH on flomax. Now s/p closed reduction of right hip this admission. His urologist is Dr. Dominic Solomon with [facility]. He has been catheter dependent for the last x3 months. He had recent cysto/UDS 2021. Cysto revealed obstructive lateral prostatic lobes with a narrow bladder neck. UDS results suggest incomplete bladder emptying possibly 2/2 to chronic bladder outlet obstruction from BPH, some detrusor muscle underactively. Dr. Dominic Solomon was planning TURP. His UA is concerning for infection with 65 wbc, yeast is present. Leukocytosis 14.5 today. Cr Stable.     Recommendations:  -New bowling placed 2021  -No growth per urine culture  -F/u with Dr. Dominic Solomon with marleny x1 week to make sure urine is sterile prior to TURP  -Would be ok to discharge per  when medically clear  -Please call with any questions      The patient had a chance to ask questions which were answered. he understands the above plan. Subjective:   Andres Epperson is a 80 y.o. male. He was seen and examined this morning. Today we discussed Follow [place] [address] Merged with [facility] before TURP. Discussed bowling needs changed x4 weeks pending turp.       Objective:   Vitals:  Vitals:    21 0715   BP: (!) 158/68   Pulse: (!) 43   Resp: 12   Temp: 97.7 °F (36.5 °C)   SpO2: 96%       Intake/Output Summary (Last 24 hours) at 2021 4831  Last data filed at 2021 1753  Gross per 24 hour   Intake 100 ml   Output 450 ml   Net -350 ml     Physical Exam:  Gen: Alert and oriented x3, no acute distress  CV: Regular rate Resp: unlabored respirations  Abd: Soft, non-distended, non-tender, no masses  Ext: no peripheral edema noted, moves upper and lower extremities spontaneously  Skin: warmand well perfused, no rashes noted on the face, or arms.      Labs:  Lab Results   Component Value Date    WBC 11.3 (H) 08/06/2021    HGB 7.6 (L) 08/06/2021    HCT 22.8 (L) 08/06/2021    MCV 92.5 08/06/2021     08/06/2021     Lab Results   Component Value Date    CREATININE 0.7 (L) 08/06/2021    BUN 14 08/06/2021     (L) 08/06/2021    K 4.5 08/06/2021    CL 99 08/06/2021    CO2 24 08/06/2021       Racheal Bland, APRN - CNP   8/6/2021

## 2021-08-06 NOTE — PROGRESS NOTES
Pt dozing on and off this morning. Calm and amiable this morning but does decline to \"take any pills today. \" will reattempt at a later time.

## 2021-08-06 NOTE — CONSULTS
Cardiac Electrophysiology Consultation   Date: 8/6/2021  Admit Date:  8/1/2021  Reason for Consultation: Bradycardia  Consult Requesting Physician: Kiana Morales MD     Chief Complaint   Patient presents with    Hip Pain     fall 3 weeks ago, R femur fx, sudden onset of R hip pain 1 hour ago, 10/10. HPI: Candis Boucher is a 80 y.o. y/o male with past medical history notable for CAD, advanced dementia, DM2 who had recent hip fracture and is s/p hip arthroplasty 8/1/2021. EP consulted for bradycardia. Pt is s/p recent cytoscopy that showed obstructive lateral prostatic lobes with narrow bladder neck. He's had chronic incomplete bladder emptying d/t chronic bladder outlet obstruction from BHB. TURP is a possibility. Unable to get reliable history from the patient but seems no complains of chest discomfort. Past Medical History:   Diagnosis Date    CAD (coronary artery disease)     Cervical disc disease     Cognitive communication deficit     Dementia ([address]     Diabetes mellitus ([address]     Hip fracture ([address]     Hyperlipidemia     Hypertension     Lack of coordination     Muscle weakness     Other abnormalities of gait and mobility     Spinal stenosis     Unsteadiness on feet         Past Surgical History:   Procedure Laterality Date    HIP CLOSED REDUCTION Right 8/1/2021    HIP CLOSED REDUCTION performed by Anais Beth MD at [address]       No Known Allergies    Social History:  Reviewed. reports that he has never smoked. He has never used smokeless tobacco. He reports previous alcohol use. He reports that he does not use drugs. Family History:  Reviewed. family history is not on file. No premature CAD. Review of System:  All other systems reviewed except for that noted above.  Pertinent negatives and positives are:     · General: negative for fever, chills   · Ophthalmic ROS: negative for - eye pain or loss of vision  · ENT ROS: negative for - headaches, sore throat · Respiratory: negative for - cough, sputum  · Cardiovascular: Reviewed in HPI  · Gastrointestinal: negative for - abdominal pain, diarrhea, N/V  · Hematology: negative for - bleeding, blood clots, bruising or jaundice  · Genito-Urinary:  negative for - Dysuria or incontinence  · Musculoskeletal: negative for - Joint swelling, muscle pain  · Neurological: negative for - confusion, dizziness, headaches   · Psychiatric: No anxiety, no depression. · Dermatological: negative for - rash    Physical Examination:  Vitals:    21 0830   BP:    Pulse: 50   Resp:    Temp:    SpO2:         Intake/Output Summary (Last 24 hours) at 2021 1153  Last data filed at 2021 1029  Gross per 24 hour   Intake 100 ml   Output 1250 ml   Net -1150 ml     In: -   Out: 1250    Wt Readings from Last 3 Encounters:   21 161 lb (73 kg)   19 186 lb 4.6 oz (84.5 kg)     Temp  Av.8 °F (36.6 °C)  Min: 97.7 °F (36.5 °C)  Max: 97.9 °F (36.6 °C)  Pulse  Av.5  Min: 43  Max: 78  BP  Min: 158/68  Max: 159/70  SpO2  Av.7 %  Min: 94 %  Max: 96 %    · Telemetry: Sinus rhythm with bradycardia 50s. · Constitutional: Alert but oriented to person and place. · Head: Normocephalic and atraumatic. · Mouth/Throat: Lips appear moist. Oropharynx is clear and moist.  · Eyes: Conjunctivae normal. EOM are normal.   · Neck: Neck supple. Supple, no JVD  · Cardiovascular: Normal rate, regular rhythm. · Pulmonary/Chest: Bilateral respiratory sounds present. No wheeze or crackles  · Abdominal: Soft. Normal bowel sounds present. No distension, No tenderness. · Musculoskeletal: Has an orthopedic boot on. · Neurological: Alert and oriented. Grossly normal with no focal neurological deficit. · Skin: Skin is warm and dry. · Psychiatric: No anxiety nor agitation. ·   Labs:  Reviewed.    Recent Labs     21  0555 21  0621 21  0608   * 130* 131*   K 4.1 4.4 4.5   CL 97* 100 99   CO2 23 23 24   PHOS 2.8 3.2 3. 1   BUN 17 16 14   CREATININE 0.8 0.7* 0.7*     Recent Labs     21  0555 21  0621 21  0608   WBC 14.5* 11.6* 11.3*   HGB 7.5* 7.2* 7.6*   HCT 22.2* 22.0* 22.8*   MCV 91.9 93.2 92.5    276 307     Lab Results   Component Value Date    TROPONINI 0.03 2021     No results found for: BNP  No results found for: PROTIME, INR  No results found for: CHOL, HDL, TRIG    Diagnostic and imaging results reviewed. EC2021: Sinus rhythm, Ventricular rate 59 bpm, PAC with abberancy. 1st degree AV delay with  ms. Echo: Report echo of  reportedly showed LVEF 57%. Cath: Remote history of PCI to OM    Telemetry in : showed HR 40-70s, with longest pauses being 2 seconds. I independently reviewed the ECG, telemetry, serology, echocardiographic results.     Scheduled Meds:   [START ON 2021] amLODIPine  5 mg Oral Daily    iron sucrose (VENOFER) iv piggyback 100 mL (Admin over 60 minutes)  200 mg Intravenous Q24H    amoxicillin  500 mg Oral 3 times per day    sodium chloride  1 g Oral TID WC    fluconazole  100 mg Oral Daily    oxyCODONE-acetaminophen  1 tablet Oral Once    sodium chloride flush  5-40 mL Intravenous 2 times per day    enoxaparin  40 mg Subcutaneous Daily    famotidine  20 mg Oral Daily    aspirin  81 mg Oral Daily    atorvastatin  80 mg Oral Nightly    citalopram  10 mg Oral Daily    tamsulosin  0.4 mg Oral Daily    insulin lispro  0-6 Units Subcutaneous TID     insulin lispro  0-3 Units Subcutaneous Nightly     Continuous Infusions:   sodium chloride      sodium chloride      dextrose       PRN Meds:.sodium chloride, Fixodent Complete, sodium chloride flush, sodium chloride, ondansetron **OR** ondansetron, polyethylene glycol, acetaminophen **OR** acetaminophen, potassium chloride **OR** potassium alternative oral replacement **OR** potassium chloride, magnesium sulfate, bisacodyl, bisacodyl, melatonin, HYDROmorphone **OR** HYDROmorphone, oxyCODONE-acetaminophen, glucose, dextrose, glucagon (rDNA), dextrose     Problem List:   Patient Active Problem List    Diagnosis Date Noted    Closed dislocation of right hip ([facility] 08/01/2021    Hip dislocation, right, initial encounter [facility])       Active Hospital Problems    Diagnosis Date Noted    Closed dislocation of right hip ([facility] [H70.936B] 08/01/2021    Hip dislocation, right, initial encounter ([facility] [S73.004A]          Consultation Assessment and Recommendation(s):  1. Sinus bradycardia   2. 1st degree AV delay. At this time there is no high grade block or concerns to intervene on it. He's had an event monitor from [facility] per the daughter, which showed no concerning arrhythmia. HR may go a lower during urologic procedures but has had low HR for over a decade with no syncope. Discussed with the patient and the daughter. The daughter doesn't think bradycardia is an issue. No need for event monitor. · No beta blockers. · Keep on telemetry. EP will sign off. Thank you for allowing me to participate in the care of Garrison Taylor . If you have any questions/comments, please do not hesitate to contact us.       Electronically signed by David Almonte MD on 8/6/2021 at 11:53 AM  Cardiac Electrophysiology  [facility]

## 2021-08-06 NOTE — PROGRESS NOTES
Attempted to call [facility] x5. Calling [phone]. X 3 disconnected by reception. X 2 transferred to floor with no answer and answering service that eventually terminates call. Left call back number with  to give report. Spoke with Jarad Torres to inform that transport ambulance did not take brace and that it is labelled and at nurses station. Per [facility] pt's \"other surgeon\" doesn't want it. Stated she will either pick it up over next several days or ok to discard if not picked up by Tuesday.

## 2021-08-06 NOTE — CARE COORDINATION
DISCHARGE SUMMARY      DATE OF DISCHARGE: 08/04/21     DISCHARGE DESTINATION: [address] [place] [address]     FACILITY     Level of Care: Long Term     Report Number: [phone]     Fax Number:  [phone]     Precert Obtained: N/A     Hens Completed: yes     PASARR: N/A     Notified: RN, Family and Facility/Agency-facility and daughter Talbot Runner aware of transport time     Prescriptions Faxed:N/A     TRANSPORTATION: [facility]  Name: [address] [place] [address] up Time: 855TG     Phone Number: [phone]     Electronically signed by CLEO Davis on 8/4/2021 at 12:20 PM

## 2021-08-06 NOTE — PROGRESS NOTES
Wound care nurse did round, please see her note and pictures available in chart. Patient's wounds were re-dressed with fresh, clean dressings. Patient did tolerate this well. Heel raised from bed.

## 2021-08-06 NOTE — PROGRESS NOTES
Daughter Jono Felling at [facility] updated to status. Right heel wound assessed by [facility] RN WOC. Awaiting new orders. Right heel elevated off of bed with rolled wash cloth. Pt took meds easily with water. Noted to be bradycardic at 46 bpm this a.m., now on telemetry cardiac consult noted. [place] but ableto follow commands t/o assessment. Continues to refused orthotic brace. Will continue to monitor.

## 2021-08-06 NOTE — CARE COORDINATION
[address] [address] Continence Nurse  Consult Note       NAME:  Favian Andrew RECORD NUMBER:  [id number]  AGE: 80 y.o. GENDER: male  : 3/18/1929  TODAY'S DATE:  2021    Subjective   Reason for WOCN Evaluation and Assessment: open wound to heel      Jb Dyson is a 80 y.o. male referred by:   [x] Physician  [x] Nursing  [] Other:     Wound Identification:  Wound Type: diabetic  Contributing Factors: chronic pressure and decreased mobility    Wound History: present on admission  Current Wound Care Treatment:   Foam dressing     Patient Goal of Care:  [x] Wound Healing  [] Odor Control  [] Palliative Care  [] Pain Control   [] Other:         PAST MEDICAL HISTORY        Diagnosis Date    CAD (coronary artery disease)     Cervical disc disease     Cognitive communication deficit     D[facility]     Diabetes mellitus ([facility]     Hip fracture ([facility]     Hyperlipidemia     Hypertension     Lack of coordination     Muscle weakness     Other abnormalities of gait and mobility     Spinal stenosis     Unsteadiness on feet        PAST SURGICAL HISTORY    Past Surgical History:   Procedure Laterality Date    HIP CLOSED REDUCTION Right 2021    HIP CLOSED REDUCTION performed by Harry Glover MD at [address]    History reviewed. No pertinent family history. SOCIAL HISTORY    Social History     Tobacco Use    Smoking status: Never Smoker    Smokeless tobacco: Never Used   Vaping Use    Vaping Use: Never used   Substance Use Topics    Alcohol use: Not Currently    Drug use: Never       ALLERGIES    No Known Allergies    MEDICATIONS    No current facility-administered medications on file prior to encounter.      Current Outpatient Medications on File Prior to Encounter   Medication Sig Dispense Refill    aluminum & magnesium hydroxide-simethicone (MAALOX) 200-200-20 MG/5ML SUSP suspension Take 30 mLs by mouth every 4 hours as needed for Indigestion      sennosides (SENOKOT) 8.8 MG/5ML syrup Take by mouth nightly      acetaminophen (TYLENOL) 500 MG tablet Take 500 mg by mouth 3 times daily      acetaminophen (TYLENOL) 325 MG tablet Take 650 mg by mouth every 4 hours as needed for Pain or Fever      aspirin 81 MG EC tablet Take 81 mg by mouth daily      atorvastatin (LIPITOR) 80 MG tablet Take 80 mg by mouth daily      bisacodyl (DULCOLAX) 5 MG EC tablet Take 5 mg by mouth daily as needed for Constipation      citalopram (CELEXA) 10 MG tablet Take 10 mg by mouth daily      bisacodyl (DULCOLAX) 10 MG suppository Place 10 mg rectally daily as needed for Constipation      melatonin 3 MG TABS tablet Take 6 mg by mouth nightly as needed      polyethylene glycol (GLYCOLAX) 17 g packet Take 17 g by mouth daily as needed for Constipation      oxyCODONE 5 MG capsule Take 5 mg by mouth every 4 hours as needed for Pain.       senna (SENOKOT) 8.6 MG tablet Take by mouth every 12 hours as needed for Constipation      tamsulosin (FLOMAX) 0.4 MG capsule Take 0.4 mg by mouth daily      Multiple Vitamins-Minerals (THERABETIC MULTI-VITAMIN PO) Take 1 tablet by mouth daily         Objective    BP (!) 158/68   Pulse 50   Temp 97.7 °F (36.5 °C) (Oral)   Resp 12   Ht 5' 11\" (1.803 m)   Wt 161 lb (73 kg)   SpO2 96%   BMI 22.45 kg/m²     LABS:  WBC:    Lab Results   Component Value Date    WBC 11.3 08/06/2021     H/H:    Lab Results   Component Value Date    HGB 7.6 08/06/2021    HCT 22.8 08/06/2021     PTT:  No results found for: APTT, PTT[APTT}  PT/INR:  No results found for: PROTIME, INR  HgBA1c:  No results found for: LABA1C    Assessment   Wellington Risk Score: Wellington Scale Score: 16    Patient Active Problem List   Diagnosis Code    Hip dislocation, right, initial encounter ([facility] S73.004A    Closed dislocation of right hip ([facility] S73.004A       Measurements:  Wound 08/01/21 Heel Right diabetic ulcer  (Active)   Wound Image   08/06/21 1222   Wound Etiology Diabetic lateral leg wound foam dressing  Right heel, Santyl, cover with moistened gauze, foam dressing  Buttocks/coccyx apply Triad cover with  foam dressing     Specialty Bed Required : No   [] Low Air Loss   [] Pressure Redistribution  [] Fluid Immersion  [] Bariatric  [] Total Pressure Relief  [] Other:     Current Diet: ADULT DIET;  Regular; 5 carb choices (75 gm/meal); 1800 ml  Adult Oral Nutrition Supplement; Diabetic Oral Supplement  Dietician consult:  No    Discharge Plan:  Placement for patient upon discharge: skilled nursing    Patient appropriate for Outpatient [address]: Yes    Referrals:  [x]   [] [facility]  [] Supplies  [] Other    Patient/Caregiver Teaching:  Level of patient/caregiver understanding able to:   [x] Indicates understanding       [] Needs reinforcement  [] Unsuccessful      [] Verbal Understanding  [] Demonstrated understanding       [] No evidence of learning  [] Refused teaching         [] N/A       Electronically signed by KRISH LawsonN, RN  Wound/Ostomy Care on 8/6/2021 at 12:26 PM

## 2021-08-07 LAB
BLOOD CULTURE, ROUTINE: NORMAL
CULTURE, BLOOD 2: NORMAL

## 2021-08-07 NOTE — DISCHARGE SUMMARY
Hospital Medicine Discharge Summary    Patient ID: Oscar Harry      Patient's PCP: Elena Curry    Admit Date: 8/1/2021     Discharge Date: 8/6/2021     Admitting Physician: Art Brenner MD     Discharge Physician: Jovanny Braden MD        Active Hospital Problems    Diagnosis     Closed dislocation of right hip [facility]) [O08.330X]     Hip dislocation, right, initial encounter ([facility] [address] [U15.267M]        The patient was seen and examined on day of discharge and this discharge summary is in conjunction with any daily progress note from day of discharge. Hospital Course: This 80 y. o. male with past medical history of recent hip fracture, status post hip arthroplasty, history of CAD, advanced dementia, diabetes mellitus, BPH presented from nursing facility in view of hip dislocation. Close dislocation of right hip: Orthopedic surgery was consulted and patient underwent close reduction of right hip on 2/2/2021. WBAT left leg; posterior hip precautions; ABD pillow in place whenver not ambulating      UTI: Urine culture grew Enterococcus faecalis and yeast  Patient was Patel catheter dependent for the last 3-month  Urology was consulted and Patel's catheter was replaced on 8/4/2021  Treated with Diflucan and Amoxil     Leukocytosis; likely secondary to UTI; trending down     Hx of BPH catheter dependent from the last 3-month  Follows with urology status post cystoscopy/UDS on 7/2021 which relieved obstructive lateral prostatic lobes with a narrow bladder neck.  UDS results suggest incomplete bladder emptying possibly 2/2 to chronic bladder outlet obstruction from BPH, some detrusor muscle underactively; plan for TURP by Dr. Esthela Carter     Hyponatremia: Likely chronic; improved with sodium tabs  Per nephrology recs, patient was given sodium tabs 1 g p.o. twice daily for 1 week on discharge and was instructed to have repeat BMP in 1 week and fax results to the nephrologist     Iron deficiency anemia: Received IV Venofer during hospital stay and was discharged home p.o. iron supplementation    BPH: Continue Flomax  Continue Flomax      Physical Exam Performed:     BP (!) 158/68   Pulse 78   Temp 97.7 °F (36.5 °C) (Oral)   Resp 12   Ht 5' 11\" (1.803 m)   Wt 161 lb (73 kg)   SpO2 96%   BMI 22.45 kg/m²       General appearance:  No apparent distress, appears stated age and cooperative. Respiratory:  Normal respiratory effort. Clear to auscultation, bilaterally without Rales/Wheezes/Rhonchi. Cardiovascular:  Regular rate and rhythm with normal S1/S2 without murmurs  Abdomen: Soft, non-tender, non-distended with normal bowel sounds. Musculoskeletal:  No clubbing, cyanosis or edema bilaterally. Neurologic:  Neurovascularly intact without any focal sensory/motor deficits. Cranial nerves: II-XII intact, grossly non-focal.  Skin: Patient has wound to left heel that was present on admission. Wound is 4x 3.5 cm, wound bed covered with dry slough and eschar. Zoraida wound is non blanchable red, dried yellow slough and devitalized tissue. There is no odor and patient has no report of pain. Will request santyl to wound, cover with saline moistened gauze and foam dressing. Discussed case with nurse Alicia Hodge, float heels off of bed. Patient also has abrasions to buttocks area, and various small abrasions to bilateral legs that are scabbed over. There is one open wound to posterior left leg with pink wound bed, no swelling or drainage noted. Will continue to use apply Triad ointment and  foam dressings to buttocks, left foot  and left leg wounds.      Labs:  For convenience and continuity at follow-up the following most recent labs are provided:      CBC:    Lab Results   Component Value Date    WBC 11.3 08/06/2021    HGB 7.6 08/06/2021    HCT 22.8 08/06/2021     08/06/2021       Renal:    Lab Results   Component Value Date     08/06/2021    K 4.5 08/06/2021    CL 99 08/06/2021    CO2 24 08/06/2021    BUN 14 08/06/2021 by mouth daily, Disp-30 tablet, R-1NO PRINT              Details   !! acetaminophen (TYLENOL) 500 MG tablet Take 500 mg by mouth 3 times dailyHistorical Med      !! acetaminophen (TYLENOL) 325 MG tablet Take 650 mg by mouth every 4 hours as needed for Pain or FeverHistorical Med      aspirin 81 MG EC tablet Take 81 mg by mouth dailyHistorical Med      atorvastatin (LIPITOR) 80 MG tablet Take 80 mg by mouth dailyHistorical Med      bisacodyl (DULCOLAX) 5 MG EC tablet Take 5 mg by mouth daily as needed for ConstipationHistorical Med      citalopram (CELEXA) 10 MG tablet Take 10 mg by mouth dailyHistorical Med      bisacodyl (DULCOLAX) 10 MG suppository Place 10 mg rectally daily as needed for ConstipationHistorical Med      melatonin 3 MG TABS tablet Take 6 mg by mouth nightly as neededHistorical Med      aluminum & magnesium hydroxide-simethicone (MAALOX) 200-200-20 MG/5ML SUSP suspension Take 30 mLs by mouth every 4 hours as needed for IndigestionHistorical Med      polyethylene glycol (GLYCOLAX) 17 g packet Take 17 g by mouth daily as needed for ConstipationHistorical Med      oxyCODONE 5 MG capsule Take 5 mg by mouth every 4 hours as needed for Pain. Historical Med      sennosides (SENOKOT) 8.8 MG/5ML syrup Take by mouth nightlyHistorical Med      senna (SENOKOT) 8.6 MG tablet Take by mouth every 12 hours as needed for ConstipationHistorical Med      tamsulosin (FLOMAX) 0.4 MG capsule Take 0.4 mg by mouth dailyHistorical Med      Multiple Vitamins-Minerals (THERABETIC MULTI-VITAMIN PO) Take 1 tablet by mouth dailyHistorical Med       !! - Potential duplicate medications found. Please discuss with provider. Time Spent on discharge is more than 30 mins in the examination, evaluation, counseling and review of medications and discharge plan. Signed:    Mabel Harry MD   8/6/2021      Thank you Claudia Curtis for the opportunity to be involved in this patient's care.  If you have any questions or concerns please feel free to contact me at [phone].

## 2021-08-07 NOTE — PROGRESS NOTES
Physician Progress Note      PATIENT:               Conrad Chandler  CSN #:                  [id number]  :                       3/18/1929  ADMIT DATE:       2021 3:21 AM  DISCH DATE:        2021 6:55 PM  RESPONDING  PROVIDER #:        Esteban Rothman MD          QUERY TEXT:    Dear Dr. Eldon Pride,    Pt admitted with diabetic ulcer to Right heel. Pt noted to have diabetes. Please document in progress notes and discharge summary the likely cause of   ulcer: The medical record reflects the following:    Risk Factors: Diabetes  Clinical Indicators: Per Nurse Care Coordination note, wound ostomy care,   patient has Diabetic Right heel wound/ulcer present on admission. Treatment: Wound Ostomy Care Nurse evaluation, Foam dressing, Float heels,   turn and reposition    Thank you    Blaze Recinos RN CDS  Options provided:  -- Right heel ulcer associated with diabetes present on admission  -- Right heel ulcer not associated with diabetes, present on admission  -- Other - I will add my own diagnosis  -- Disagree - Not applicable / Not valid  -- Disagree - Clinically unable to determine / Unknown  -- Refer to Clinical Documentation Reviewer    PROVIDER RESPONSE TEXT:    This patient has Right heel ulcer not associated with diabetes, present on   admission.     Query created by: Lorelei Blackmon on 2021 3:11 PM      Electronically signed by:  Esteban Rothman MD 2021 6:30 PM

## 2022-08-17 NOTE — PROGRESS NOTES
[facility] PROGRESS NOTE    8/5/2021 12:34 PM        Name: Jb Dyson . Admitted: 8/1/2021  Primary Care Provider: Brittany Smith (Tel: [phone])      CC: Hip dislocation    Brief Course: This 80 y. o. male with past medical history of recent hip fracture, status post hip arthroplasty, history of CAD, advanced dementia, diabetes mellitus, BPH presents from nursing facility in view of hip dislocation. Patient status post OR for closed reduction of right hip with orthopedic surgery on 8/2/20121. Medicine consulted for admission. Patient does not provide good history in view of dementia    Interval history:   Patient seen and examined today  Overnight events and interval ancillary notes reviewed  Patient resting bed with eyes closed; endorsed headache but denies any fever, chest pain, shortness of breath, cough or palpitations  Serum sodium level improved after salt tabs  Urine studies still pending  Plan for the patient to be discharged tomorrow if medically stable      Assessment & Plan:   Close dislocation of right hip  Status post reduction  Pain management per orthopedic surgery     Leukocytosis; likely secondary to UTI; trending down  Blood and urine culture ordered  Patient has a chronic Patel's  Urine analysis suggestive of UTI; started empiric ceftriaxone awaiting culture    UTI: Urine suggestive of infection  Started on ceftriaxone and Diflucan for 4 days  Patel exchanged; repeat urine analysis order    Hx of BPH catheter dependent on the last 3-month  Follows with urology status post cystoscopy/UDS on 7/2021 which relieved obstructive lateral prostatic lobes with a narrow bladder neck.  UDS results suggest incomplete bladder emptying possibly 2/2 to chronic bladder outlet obstruction from BPH, some detrusor muscle underactively; plan for TURP by Dr. Caro Daniel    Hyponatremia: Chronic  Follow-up sodium level morning Daily  atorvastatin (LIPITOR) tablet 80 mg, Nightly  bisacodyl (DULCOLAX) suppository 10 mg, Daily PRN  bisacodyl (DULCOLAX) EC tablet 5 mg, Daily PRN  citalopram (CELEXA) tablet 10 mg, Daily  melatonin tablet 6 mg, Nightly PRN  tamsulosin (FLOMAX) capsule 0.4 mg, Daily  HYDROmorphone HCl PF (DILAUDID) injection 0.25 mg, Q3H PRN   Or  HYDROmorphone HCl PF (DILAUDID) injection 0.5 mg, Q3H PRN  oxyCODONE-acetaminophen (PERCOCET) 5-325 MG per tablet 1 tablet, Q4H PRN  insulin lispro (1 Unit Dial) 0-6 Units, TID WC  insulin lispro (1 Unit Dial) 0-3 Units, Nightly  glucose (GLUTOSE) 40 % oral gel 15 g, PRN  dextrose 50 % IV solution, PRN  glucagon (rDNA) injection 1 mg, PRN  dextrose 5 % solution, PRN        Objective:  BP (!) 155/55   Pulse 59   Temp 97.4 °F (36.3 °C) (Oral)   Resp 16   Ht 5' 11\" (1.803 m)   Wt 161 lb (73 kg)   SpO2 95%   BMI 22.45 kg/m²     Intake/Output Summary (Last 24 hours) at 8/5/2021 1234  Last data filed at 8/5/2021 5475  Gross per 24 hour   Intake --   Output 550 ml   Net -550 ml      Wt Readings from Last 3 Encounters:   08/05/21 161 lb (73 kg)   12/12/19 186 lb 4.6 oz (84.5 kg)       Physical Examination:   General appearance: No apparent distress appears stated age and cooperative. HEENT Normal cephalic, atraumatic without obvious deformity. Pupils equal, round, and reactive to light. Extra ocular muscles intact. Conjunctivae/corneas clear. Lungs: Clear to auscultation, bilaterally without Rales/Wheezes/Rhonchi with good respiratory effort. Heart: Regular rate and rhythm with Normal S1/S2 without murmurs, rubs or gallops, point of maximum impulse non-displaced  Abdomen: Soft, non-tender or non-distended without rigidity or guarding and positive bowel sounds all four quadrants. Extremities: No clubbing, cyanosis, or edema bilaterally. Full range of motion without deformity and normal gait intact.   Neurologic: Alert and oriented X 3, neurovascularly intact with sensory/motor intact upper extremities/lower extremities, bilaterally. Cranial nerves: II-XII intact, grossly non-focal.  Psychiatry: Appropriate affect. Not agitated  Skin: Patient has wound to left heel that was present on admission. Wound is 4x 3.5 cm, wound bed covered with dry slough and eschar. Zoraida wound is non blanchable red, dried yellow slough and devitalized tissue. There is no odor and patient has no report of pain. Will request santyl to wound, cover with saline moistened gauze and foam dressing. Discussed case with nurse Elizabeth Cid, float heels off of bed. Patient also has abrasions to buttocks area, and various small abrasions to bilateral legs that are scabbed over. There is one open wound to posterior left leg with pink wound bed, no swelling or drainage noted. Will continue to use apply Triad ointment and  foam dressings to buttocks, left foot  and left leg wounds. Labs and Tests:  CBC:   Recent Labs     08/03/21  1753 08/04/21  0555 08/05/21  0621   WBC 16.7* 14.5* 11.6*   HGB 7.9* 7.5* 7.2*    285 276     BMP:    Recent Labs     08/03/21  1753 08/04/21  0555 08/05/21  0621   * 127* 130*   K 4.9 4.1 4.4   CL 98* 97* 100   CO2 22 23 23   BUN 19 17 16   CREATININE 0.8 0.8 0.7*   GLUCOSE 165* 106* 101*     Hepatic: No results for input(s): AST, ALT, ALB, BILITOT, ALKPHOS in the last 72 hours. XR ABDOMEN (2 VIEWS)   Final Result   Findings suggest early/partial small bowel obstruction without evidence for   fecal impaction and very little formed stool present. FLUORO FOR SURGICAL PROCEDURES   Final Result      XR HIP LEFT (1 VIEW)   Final Result      XR PELVIS (1-2 VIEWS)   Final Result   Persistent superior dislocation of the right hip prosthesis. XR HIP RIGHT (1 VIEW)   Final Result   Dislocated right hip prosthesis.              Problem List  Active Problems:    Hip dislocation, right, initial encounter ([address]    Closed dislocation of right hip ([address]  Resolved Problems:    * No resolved hospital problems.  Suzan Bird MD   8/5/2021 12:34 PM